# Patient Record
Sex: MALE | Race: BLACK OR AFRICAN AMERICAN | Employment: OTHER | URBAN - METROPOLITAN AREA
[De-identification: names, ages, dates, MRNs, and addresses within clinical notes are randomized per-mention and may not be internally consistent; named-entity substitution may affect disease eponyms.]

---

## 2017-09-14 ENCOUNTER — HOSPITAL ENCOUNTER (OUTPATIENT)
Age: 77
Setting detail: SPECIMEN
Discharge: HOME | End: 2017-09-14
Payer: MEDICARE

## 2017-09-14 LAB
ABSOLUTE EOS #: 0.2 K/UL (ref 0–0.4)
ABSOLUTE LYMPH #: 2.9 K/UL (ref 1–4.8)
ABSOLUTE MONO #: 0.8 K/UL (ref 0.1–1.2)
BASOPHILS # BLD: 0 %
BASOPHILS ABSOLUTE: 0 K/UL (ref 0–0.2)
C-REACTIVE PROTEIN: 0.6 MG/L (ref 0–5)
DIFFERENTIAL TYPE: NORMAL
EOSINOPHILS RELATIVE PERCENT: 3 %
HCT VFR BLD CALC: 43.8 % (ref 41–53)
HEMOGLOBIN: 14.5 G/DL (ref 13.5–17.5)
LYMPHOCYTES # BLD: 38 %
MCH RBC QN AUTO: 31 PG (ref 26–34)
MCHC RBC AUTO-ENTMCNC: 33.2 G/DL (ref 31–37)
MCV RBC AUTO: 93.4 FL (ref 80–100)
MONOCYTES # BLD: 10 %
PDW BLD-RTO: 15.1 % (ref 12.5–15.4)
PLATELET # BLD: NORMAL K/UL (ref 140–450)
PLATELET ESTIMATE: NORMAL
PMV BLD AUTO: NORMAL FL (ref 6–12)
RBC # BLD: 4.7 M/UL (ref 4.5–5.9)
RBC # BLD: NORMAL 10*6/UL
SEDIMENTATION RATE, ERYTHROCYTE: 1 MM (ref 0–10)
SEG NEUTROPHILS: 49 %
SEGMENTED NEUTROPHILS ABSOLUTE COUNT: 3.8 K/UL (ref 1.8–7.7)
WBC # BLD: 7.8 K/UL (ref 3.5–11)
WBC # BLD: NORMAL 10*3/UL

## 2017-09-16 LAB
CULTURE: ABNORMAL
CULTURE: NO GROWTH
DIRECT EXAM: ABNORMAL
Lab: ABNORMAL
SPECIMEN DESCRIPTION: ABNORMAL
STATUS: ABNORMAL

## 2017-11-14 ENCOUNTER — HOSPITAL ENCOUNTER (OUTPATIENT)
Age: 77
Setting detail: SPECIMEN
Discharge: HOME | End: 2017-11-14
Payer: MEDICARE

## 2017-11-15 LAB
APPEARANCE FLUID: NORMAL
BASO FLUID: NORMAL %
COLOR FLUID: NORMAL
CRYSTALS, FLUID: NEGATIVE
EOSINOPHIL FLUID: NORMAL %
FLUID DIFF COMMENT: NORMAL
LYMPHOCYTES, BODY FLUID: 1 %
MONOCYTE, FLUID: NORMAL %
NEUTROPHIL, FLUID: 4 %
OTHER CELLS FLUID: NORMAL %
RBC FLUID: NORMAL /MM3
SPECIMEN TYPE: NORMAL
SPECIMEN TYPE: NORMAL
WBC FLUID: 755 /MM3

## 2018-08-10 ENCOUNTER — ANESTHESIA EVENT (OUTPATIENT)
Dept: OPERATING ROOM | Age: 78
End: 2018-08-10
Payer: MEDICARE

## 2018-08-13 ENCOUNTER — HOSPITAL ENCOUNTER (OUTPATIENT)
Age: 78
Setting detail: OUTPATIENT SURGERY
Discharge: HOME | End: 2018-08-13
Attending: SURGERY | Admitting: SURGERY
Payer: MEDICARE

## 2018-08-13 ENCOUNTER — ANESTHESIA (OUTPATIENT)
Dept: OPERATING ROOM | Age: 78
End: 2018-08-13
Payer: MEDICARE

## 2018-08-13 VITALS — DIASTOLIC BLOOD PRESSURE: 97 MMHG | SYSTOLIC BLOOD PRESSURE: 173 MMHG | OXYGEN SATURATION: 100 %

## 2018-08-13 VITALS
WEIGHT: 150 LBS | BODY MASS INDEX: 23.54 KG/M2 | OXYGEN SATURATION: 98 % | RESPIRATION RATE: 18 BRPM | TEMPERATURE: 96.8 F | HEART RATE: 90 BPM | SYSTOLIC BLOOD PRESSURE: 157 MMHG | HEIGHT: 67 IN | DIASTOLIC BLOOD PRESSURE: 73 MMHG

## 2018-08-13 LAB
GLUCOSE BLD-MCNC: 128 MG/DL (ref 75–110)
GLUCOSE BLD-MCNC: 128 MG/DL (ref 75–110)

## 2018-08-13 PROCEDURE — 82947 ASSAY GLUCOSE BLOOD QUANT: CPT

## 2018-08-13 PROCEDURE — 3609010600 HC COLONOSCOPY POLYPECTOMY SNARE/COLD BIOPSY: Performed by: SURGERY

## 2018-08-13 PROCEDURE — 3609012400 HC EGD TRANSORAL BIOPSY SINGLE/MULTIPLE: Performed by: SURGERY

## 2018-08-13 PROCEDURE — 2709999900 HC NON-CHARGEABLE SUPPLY: Performed by: SURGERY

## 2018-08-13 PROCEDURE — 7100000011 HC PHASE II RECOVERY - ADDTL 15 MIN: Performed by: SURGERY

## 2018-08-13 PROCEDURE — 7100000010 HC PHASE II RECOVERY - FIRST 15 MIN: Performed by: SURGERY

## 2018-08-13 PROCEDURE — 6370000000 HC RX 637 (ALT 250 FOR IP): Performed by: SURGERY

## 2018-08-13 PROCEDURE — 3700000001 HC ADD 15 MINUTES (ANESTHESIA): Performed by: SURGERY

## 2018-08-13 PROCEDURE — 2500000003 HC RX 250 WO HCPCS: Performed by: ANESTHESIOLOGY

## 2018-08-13 PROCEDURE — 6360000002 HC RX W HCPCS: Performed by: ANESTHESIOLOGY

## 2018-08-13 PROCEDURE — 88305 TISSUE EXAM BY PATHOLOGIST: CPT

## 2018-08-13 PROCEDURE — 2500000003 HC RX 250 WO HCPCS: Performed by: NURSE ANESTHETIST, CERTIFIED REGISTERED

## 2018-08-13 PROCEDURE — 6360000002 HC RX W HCPCS: Performed by: NURSE ANESTHETIST, CERTIFIED REGISTERED

## 2018-08-13 PROCEDURE — 2580000003 HC RX 258: Performed by: NURSE ANESTHETIST, CERTIFIED REGISTERED

## 2018-08-13 PROCEDURE — 3700000000 HC ANESTHESIA ATTENDED CARE: Performed by: SURGERY

## 2018-08-13 RX ORDER — PROPOFOL 10 MG/ML
INJECTION, EMULSION INTRAVENOUS PRN
Status: DISCONTINUED | OUTPATIENT
Start: 2018-08-13 | End: 2018-08-13 | Stop reason: SDUPTHER

## 2018-08-13 RX ORDER — SODIUM CHLORIDE, SODIUM LACTATE, POTASSIUM CHLORIDE, CALCIUM CHLORIDE 600; 310; 30; 20 MG/100ML; MG/100ML; MG/100ML; MG/100ML
INJECTION, SOLUTION INTRAVENOUS CONTINUOUS PRN
Status: DISCONTINUED | OUTPATIENT
Start: 2018-08-13 | End: 2018-08-13 | Stop reason: SDUPTHER

## 2018-08-13 RX ORDER — ESMOLOL HYDROCHLORIDE 10 MG/ML
INJECTION INTRAVENOUS PRN
Status: DISCONTINUED | OUTPATIENT
Start: 2018-08-13 | End: 2018-08-13 | Stop reason: SDUPTHER

## 2018-08-13 RX ORDER — LIDOCAINE HYDROCHLORIDE 10 MG/ML
1 INJECTION, SOLUTION EPIDURAL; INFILTRATION; INTRACAUDAL; PERINEURAL
Status: DISCONTINUED | OUTPATIENT
Start: 2018-08-14 | End: 2018-08-13 | Stop reason: HOSPADM

## 2018-08-13 RX ORDER — FENTANYL CITRATE 50 UG/ML
50 INJECTION, SOLUTION INTRAMUSCULAR; INTRAVENOUS EVERY 5 MIN PRN
Status: DISCONTINUED | OUTPATIENT
Start: 2018-08-13 | End: 2018-08-13 | Stop reason: HOSPADM

## 2018-08-13 RX ORDER — ONDANSETRON 2 MG/ML
4 INJECTION INTRAMUSCULAR; INTRAVENOUS
Status: DISCONTINUED | OUTPATIENT
Start: 2018-08-13 | End: 2018-08-13 | Stop reason: HOSPADM

## 2018-08-13 RX ORDER — SODIUM CHLORIDE 0.9 % (FLUSH) 0.9 %
10 SYRINGE (ML) INJECTION PRN
Status: DISCONTINUED | OUTPATIENT
Start: 2018-08-13 | End: 2018-08-13 | Stop reason: HOSPADM

## 2018-08-13 RX ORDER — HYDRALAZINE HYDROCHLORIDE 20 MG/ML
5 INJECTION INTRAMUSCULAR; INTRAVENOUS ONCE
Status: COMPLETED | OUTPATIENT
Start: 2018-08-13 | End: 2018-08-13

## 2018-08-13 RX ORDER — LIDOCAINE HYDROCHLORIDE 10 MG/ML
INJECTION, SOLUTION EPIDURAL; INFILTRATION; INTRACAUDAL; PERINEURAL PRN
Status: DISCONTINUED | OUTPATIENT
Start: 2018-08-13 | End: 2018-08-13 | Stop reason: SDUPTHER

## 2018-08-13 RX ORDER — SODIUM CHLORIDE 0.9 % (FLUSH) 0.9 %
10 SYRINGE (ML) INJECTION EVERY 12 HOURS SCHEDULED
Status: DISCONTINUED | OUTPATIENT
Start: 2018-08-13 | End: 2018-08-13 | Stop reason: HOSPADM

## 2018-08-13 RX ORDER — LABETALOL HYDROCHLORIDE 5 MG/ML
5 INJECTION, SOLUTION INTRAVENOUS ONCE
Status: COMPLETED | OUTPATIENT
Start: 2018-08-13 | End: 2018-08-13

## 2018-08-13 RX ORDER — SODIUM CHLORIDE, SODIUM LACTATE, POTASSIUM CHLORIDE, CALCIUM CHLORIDE 600; 310; 30; 20 MG/100ML; MG/100ML; MG/100ML; MG/100ML
INJECTION, SOLUTION INTRAVENOUS CONTINUOUS
Status: DISCONTINUED | OUTPATIENT
Start: 2018-08-14 | End: 2018-08-13 | Stop reason: HOSPADM

## 2018-08-13 RX ORDER — FENTANYL CITRATE 50 UG/ML
25 INJECTION, SOLUTION INTRAMUSCULAR; INTRAVENOUS EVERY 5 MIN PRN
Status: DISCONTINUED | OUTPATIENT
Start: 2018-08-13 | End: 2018-08-13 | Stop reason: HOSPADM

## 2018-08-13 RX ORDER — SODIUM CHLORIDE 9 MG/ML
INJECTION, SOLUTION INTRAVENOUS CONTINUOUS
Status: DISCONTINUED | OUTPATIENT
Start: 2018-08-14 | End: 2018-08-13 | Stop reason: HOSPADM

## 2018-08-13 RX ADMIN — PROPOFOL 20 MG: 10 INJECTION, EMULSION INTRAVENOUS at 09:57

## 2018-08-13 RX ADMIN — PROPOFOL 20 MG: 10 INJECTION, EMULSION INTRAVENOUS at 10:25

## 2018-08-13 RX ADMIN — PROPOFOL 20 MG: 10 INJECTION, EMULSION INTRAVENOUS at 10:42

## 2018-08-13 RX ADMIN — PROPOFOL 20 MG: 10 INJECTION, EMULSION INTRAVENOUS at 10:36

## 2018-08-13 RX ADMIN — LABETALOL HYDROCHLORIDE 5 MG: 5 INJECTION, SOLUTION INTRAVENOUS at 12:50

## 2018-08-13 RX ADMIN — HYDRALAZINE HYDROCHLORIDE 5 MG: 20 INJECTION INTRAMUSCULAR; INTRAVENOUS at 13:13

## 2018-08-13 RX ADMIN — PROPOFOL 20 MG: 10 INJECTION, EMULSION INTRAVENOUS at 10:01

## 2018-08-13 RX ADMIN — PROPOFOL 20 MG: 10 INJECTION, EMULSION INTRAVENOUS at 10:17

## 2018-08-13 RX ADMIN — PROPOFOL 20 MG: 10 INJECTION, EMULSION INTRAVENOUS at 09:53

## 2018-08-13 RX ADMIN — PROPOFOL 20 MG: 10 INJECTION, EMULSION INTRAVENOUS at 10:32

## 2018-08-13 RX ADMIN — PROPOFOL 20 MG: 10 INJECTION, EMULSION INTRAVENOUS at 10:40

## 2018-08-13 RX ADMIN — ESMOLOL HYDROCHLORIDE 5 MG: 10 INJECTION, SOLUTION INTRAVENOUS at 09:56

## 2018-08-13 RX ADMIN — PROPOFOL 20 MG: 10 INJECTION, EMULSION INTRAVENOUS at 10:13

## 2018-08-13 RX ADMIN — PROPOFOL 20 MG: 10 INJECTION, EMULSION INTRAVENOUS at 10:29

## 2018-08-13 RX ADMIN — LABETALOL HYDROCHLORIDE 5 MG: 5 INJECTION, SOLUTION INTRAVENOUS at 12:11

## 2018-08-13 RX ADMIN — PROPOFOL 20 MG: 10 INJECTION, EMULSION INTRAVENOUS at 10:07

## 2018-08-13 RX ADMIN — SODIUM CHLORIDE, POTASSIUM CHLORIDE, SODIUM LACTATE AND CALCIUM CHLORIDE: 600; 310; 30; 20 INJECTION, SOLUTION INTRAVENOUS at 09:43

## 2018-08-13 RX ADMIN — PROPOFOL 20 MG: 10 INJECTION, EMULSION INTRAVENOUS at 10:04

## 2018-08-13 RX ADMIN — PROPOFOL 20 MG: 10 INJECTION, EMULSION INTRAVENOUS at 10:27

## 2018-08-13 RX ADMIN — PROPOFOL 20 MG: 10 INJECTION, EMULSION INTRAVENOUS at 10:34

## 2018-08-13 RX ADMIN — PROPOFOL 20 MG: 10 INJECTION, EMULSION INTRAVENOUS at 10:19

## 2018-08-13 RX ADMIN — PROPOFOL 20 MG: 10 INJECTION, EMULSION INTRAVENOUS at 10:11

## 2018-08-13 RX ADMIN — PROPOFOL 50 MG: 10 INJECTION, EMULSION INTRAVENOUS at 09:51

## 2018-08-13 RX ADMIN — PROPOFOL 20 MG: 10 INJECTION, EMULSION INTRAVENOUS at 10:15

## 2018-08-13 RX ADMIN — PROPOFOL 100 MG: 10 INJECTION, EMULSION INTRAVENOUS at 09:49

## 2018-08-13 RX ADMIN — PROPOFOL 20 MG: 10 INJECTION, EMULSION INTRAVENOUS at 10:23

## 2018-08-13 RX ADMIN — PROPOFOL 20 MG: 10 INJECTION, EMULSION INTRAVENOUS at 09:59

## 2018-08-13 RX ADMIN — PROPOFOL 20 MG: 10 INJECTION, EMULSION INTRAVENOUS at 10:44

## 2018-08-13 RX ADMIN — PROPOFOL 20 MG: 10 INJECTION, EMULSION INTRAVENOUS at 10:09

## 2018-08-13 RX ADMIN — PROPOFOL 20 MG: 10 INJECTION, EMULSION INTRAVENOUS at 09:55

## 2018-08-13 RX ADMIN — LIDOCAINE HYDROCHLORIDE 50 MG: 10 INJECTION, SOLUTION EPIDURAL; INFILTRATION; INTRACAUDAL; PERINEURAL at 09:49

## 2018-08-13 RX ADMIN — PROPOFOL 20 MG: 10 INJECTION, EMULSION INTRAVENOUS at 10:38

## 2018-08-13 RX ADMIN — PROPOFOL 20 MG: 10 INJECTION, EMULSION INTRAVENOUS at 10:21

## 2018-08-13 RX ADMIN — ESMOLOL HYDROCHLORIDE 10 MG: 10 INJECTION, SOLUTION INTRAVENOUS at 10:05

## 2018-08-13 RX ADMIN — PROPOFOL 20 MG: 10 INJECTION, EMULSION INTRAVENOUS at 10:30

## 2018-08-13 ASSESSMENT — PULMONARY FUNCTION TESTS
PIF_VALUE: 1
PIF_VALUE: 0
PIF_VALUE: 1
PIF_VALUE: 2
PIF_VALUE: 1
PIF_VALUE: 0
PIF_VALUE: 1

## 2018-08-13 ASSESSMENT — PAIN SCALES - GENERAL
PAINLEVEL_OUTOF10: 0

## 2018-08-13 NOTE — OP NOTE
[address] [facility] [address]                 [address] [place] [address]                                 OPERATIVE REPORT    PATIENT NAME: Blaire Basilio                      :        1940  MED REC NO:   [id number]                             ROOM:  ACCOUNT NO:   [de-identified]                           ADMIT DATE: 2018  PROVIDER:     Stan Jimenes    DATE OF PROCEDURE:  2018    PREPROCEDURE DIAGNOSES:  History of gastritis and colon polyps. POSTPROCEDURE DIAGNOSES:  History of gastritis and colon polyps. PROCEDURE PERFORMED:  Esophagogastroduodenoscopy with gastric biopsies  followed by colonoscopy to the cecum where random cecal biopsies and  polypectomies at 1 m, 70 cm, 60 cm in the rectal area. ENDOSCOPIST:  Dr. Alba Johnson:  JEMIMA.    INDICATIONS:  This is an 69-year-old -American male has a history of  pancreatic cancer and previously has had two colon polyps removed and he  has had an abnormal gastric biopsy. He is brought in now for upper and  lower GI endoscopies. FINDINGS:  The esophagus was normal.  The Z-line was normal.  The stomach  has some chronic inflammation in the distal antral area. The pylorus was  normal as was the duodenum. After random biopsies were taken from the  antral area of the stomach, then flexible colonoscopy was carried out to  the cecum using a variable stiffness scope. Random cecal biopsies were  taken and careful withdrawal showed that he had some liquid stools  throughout his bowel, which _____ meticulous evaluation of the mucosa. However, there were polyps seen at 1 m, 60 cm, 70 cm in the rectal area. All the polyps seen were removed using a cold biopsy snare technique. At  the end of the procedure, the patient was taken to recovery in satisfactory  condition.         Ken Still    D: 2018 11:10:43       T: 2018 15:32:58     [email or web address]  Job#: [id number]

## 2018-08-13 NOTE — BRIEF OP NOTE
Brief Postoperative Note    Arnold Short  YOB: 1940  [id number]    Pre-operative Diagnosis: hx colon polyps and gastritis    Post-operative Diagnosis: Same    Procedure: EGD and Bx and colonoscopic polypectomies    Anesthesia: MAC    Surgeons/Assistants: none    Estimated Blood Loss: less than 50     Complications: None    Specimens: Was Obtained: from 100 ms, 70 cms and 60cms    Findings:  Chronic gastritis and colon polyps    Electronically signed by Javier Dutta MD on 8/13/2018 at 10:54 AM

## 2018-08-13 NOTE — ANESTHESIA PRE PROCEDURE
Alcohol use No                                Counseling given: Not Answered      Vital Signs (Current):   Vitals:    08/13/18 0848   BP: (!) 173/93   Pulse: 70   Resp: 16   Temp: 98.4 °F (36.9 °C)   TempSrc: Oral   SpO2: 99%   Weight: 150 lb (68 kg)   Height: 5' 7\" (1.702 m)                                              BP Readings from Last 3 Encounters:   08/13/18 (!) 173/93   10/30/17 124/80   10/17/16 198/89       NPO Status: Time of last liquid consumption: 1900                        Time of last solid consumption: 0800                        Date of last liquid consumption: 08/12/18                        Date of last solid food consumption: 08/12/18    BMI:   Wt Readings from Last 3 Encounters:   08/13/18 150 lb (68 kg)   10/30/17 150 lb (68 kg)   10/17/16 150 lb (68 kg)     Body mass index is 23.49 kg/m². CBC:   Lab Results   Component Value Date    WBC 7.8 09/14/2017    RBC 4.70 09/14/2017    RBC 4.19 01/04/2012    HGB 14.5 09/14/2017    HCT 43.8 09/14/2017    MCV 93.4 09/14/2017    RDW 15.1 09/14/2017    PLT Platelet clumps present, count appears adequate. 09/14/2017     01/04/2012       CMP:   Lab Results   Component Value Date     12/08/2015    K 4.1 12/08/2015     12/08/2015    CO2 26 12/08/2015    BUN 9 12/08/2015    CREATININE 0.92 12/08/2015    GFRAA >60 03/20/2015    LABGLOM >60 03/20/2015    GLUCOSE 144 12/08/2015    GLUCOSE 135 03/07/2012    PROT 7.2 10/02/2014    CALCIUM 9.7 12/08/2015    BILITOT 0.7 12/08/2015    ALKPHOS 94 12/08/2015    AST 24 12/08/2015    ALT 19 12/08/2015       POC Tests: No results for input(s): POCGLU, POCNA, POCK, POCCL, POCBUN, POCHEMO, POCHCT in the last 72 hours.     Coags:   Lab Results   Component Value Date    PROTIME 10.7 09/05/2013    INR 1.0 09/05/2013       HCG (If Applicable): No results found for: PREGTESTUR, PREGSERUM, HCG, HCGQUANT     ABGs: No results found for: PHART, PO2ART, BWK5WGO, KNR0YCR, BEART, W3WLBOGX     Type & Screen (If Applicable):  No results found for: LABABO, LABRH    Anesthesia Evaluation   no history of anesthetic complications:   Airway: Mallampati: I  TM distance: >3 FB   Neck ROM: full  Mouth opening: > = 3 FB Dental:    (+) edentulous      Pulmonary: breath sounds clear to auscultation      (-) COPD, asthma, recent URI and sleep apnea                           Cardiovascular:    (+) hypertension:, past MI: > 6 months, CABG/stent:, hyperlipidemia    (-) dysrhythmias,  angina and  DURAND      Rhythm: regular  Rate: normal                    Neuro/Psych:   Negative Neuro/Psych ROS              GI/Hepatic/Renal:   (+) hiatal hernia, PUD, bowel prep,           Endo/Other:    (+) DiabetesType II DM, , : arthritis: OA., .                 Abdominal:           Vascular:                                        Anesthesia Plan      MAC     ASA 3       Induction: intravenous. Anesthetic plan and risks discussed with patient.                       Derik Rodriguez MD   8/13/2018

## 2018-08-13 NOTE — ANESTHESIA POSTPROCEDURE EVALUATION
Department of Anesthesiology  Postprocedure Note    Patient: Belen Ku  MRN: [id number]  YOB: 1940  Date of evaluation: 8/13/2018  Time:  11:55 AM     Procedure Summary     Date:  08/13/18 Room / Location:  [place] [address] / [facility] OR    Anesthesia Start:  1059 Anesthesia Stop:  0580    Procedures:       EGD BIOPSY      COLONOSCOPY POLYPECTOMY COLD BIOPSY Diagnosis:  (DX HX GASTRIC ULCER, HX ADENOMATOUS POLYPS)    Surgeon:  Ernesto Arevalo MD Responsible Provider:  Lizbet Hickman MD    Anesthesia Type:  MAC ASA Status:  3          Anesthesia Type: MAC    Iván Phase I: Iván Score: 10    Iván Phase II: Iván Score: 6    Last vitals: Reviewed and per EMR flowsheets.        Anesthesia Post Evaluation    Patient location during evaluation: PACU  Level of consciousness: awake and alert  Airway patency: patent  Nausea & Vomiting: no nausea and no vomiting  Complications: no  Cardiovascular status: blood pressure returned to baseline  Respiratory status: acceptable  Hydration status: euvolemic

## 2018-08-14 LAB — SURGICAL PATHOLOGY REPORT: NORMAL

## 2019-01-31 ENCOUNTER — HOSPITAL ENCOUNTER (OUTPATIENT)
Age: 79
Setting detail: SPECIMEN
Discharge: HOME | End: 2019-01-31
Payer: MEDICARE

## 2019-01-31 LAB — MAGNESIUM: 1.3 MG/DL (ref 1.6–2.6)

## 2019-01-31 PROCEDURE — 36415 COLL VENOUS BLD VENIPUNCTURE: CPT

## 2019-01-31 PROCEDURE — P9603 ONE-WAY ALLOW PRORATED MILES: HCPCS

## 2019-01-31 PROCEDURE — 83735 ASSAY OF MAGNESIUM: CPT

## 2022-07-21 ENCOUNTER — OFFICE VISIT (OUTPATIENT)
Dept: FAMILY MEDICINE CLINIC | Age: 82
End: 2022-07-21
Payer: MEDICARE

## 2022-07-21 VITALS
WEIGHT: 150 LBS | DIASTOLIC BLOOD PRESSURE: 74 MMHG | HEART RATE: 88 BPM | BODY MASS INDEX: 23.49 KG/M2 | SYSTOLIC BLOOD PRESSURE: 136 MMHG

## 2022-07-21 DIAGNOSIS — I25.83 CORONARY ARTERY DISEASE DUE TO LIPID RICH PLAQUE: ICD-10-CM

## 2022-07-21 DIAGNOSIS — I25.10 CORONARY ARTERY DISEASE DUE TO LIPID RICH PLAQUE: ICD-10-CM

## 2022-07-21 DIAGNOSIS — E11.69 TYPE 2 DIABETES MELLITUS WITH OTHER SPECIFIED COMPLICATION, WITHOUT LONG-TERM CURRENT USE OF INSULIN (HCC): Primary | ICD-10-CM

## 2022-07-21 DIAGNOSIS — R82.90 URINE ABNORMALITY: ICD-10-CM

## 2022-07-21 DIAGNOSIS — N50.811 PAIN IN RIGHT TESTICLE: ICD-10-CM

## 2022-07-21 DIAGNOSIS — Z00.00 HEALTHCARE MAINTENANCE: ICD-10-CM

## 2022-07-21 LAB — HBA1C MFR BLD: 10.1 %

## 2022-07-21 PROCEDURE — 3046F HEMOGLOBIN A1C LEVEL >9.0%: CPT | Performed by: STUDENT IN AN ORGANIZED HEALTH CARE EDUCATION/TRAINING PROGRAM

## 2022-07-21 PROCEDURE — 83036 HEMOGLOBIN GLYCOSYLATED A1C: CPT | Performed by: STUDENT IN AN ORGANIZED HEALTH CARE EDUCATION/TRAINING PROGRAM

## 2022-07-21 PROCEDURE — G8427 DOCREV CUR MEDS BY ELIG CLIN: HCPCS | Performed by: STUDENT IN AN ORGANIZED HEALTH CARE EDUCATION/TRAINING PROGRAM

## 2022-07-21 PROCEDURE — 99203 OFFICE O/P NEW LOW 30 MIN: CPT | Performed by: STUDENT IN AN ORGANIZED HEALTH CARE EDUCATION/TRAINING PROGRAM

## 2022-07-21 PROCEDURE — G8420 CALC BMI NORM PARAMETERS: HCPCS | Performed by: STUDENT IN AN ORGANIZED HEALTH CARE EDUCATION/TRAINING PROGRAM

## 2022-07-21 PROCEDURE — 1123F ACP DISCUSS/DSCN MKR DOCD: CPT | Performed by: STUDENT IN AN ORGANIZED HEALTH CARE EDUCATION/TRAINING PROGRAM

## 2022-07-21 PROCEDURE — 1036F TOBACCO NON-USER: CPT | Performed by: STUDENT IN AN ORGANIZED HEALTH CARE EDUCATION/TRAINING PROGRAM

## 2022-07-21 RX ORDER — TAMSULOSIN HYDROCHLORIDE 0.4 MG/1
0.4 CAPSULE ORAL DAILY
Qty: 30 CAPSULE | Refills: 0 | Status: SHIPPED | OUTPATIENT
Start: 2022-07-21

## 2022-07-21 RX ORDER — LISINOPRIL 2.5 MG/1
TABLET ORAL
Qty: 30 TABLET | Refills: 2 | Status: SHIPPED | OUTPATIENT
Start: 2022-07-21

## 2022-07-21 RX ORDER — ATORVASTATIN CALCIUM 40 MG/1
40 TABLET, FILM COATED ORAL DAILY
Qty: 90 TABLET | Refills: 1 | Status: SHIPPED | OUTPATIENT
Start: 2022-07-21

## 2022-07-21 RX ORDER — ASPIRIN 81 MG/1
81 TABLET ORAL DAILY
Qty: 90 TABLET | Refills: 1 | Status: SHIPPED | OUTPATIENT
Start: 2022-07-21

## 2022-07-21 RX ORDER — GABAPENTIN 300 MG/1
CAPSULE ORAL
Qty: 180 CAPSULE | Refills: 1 | Status: SHIPPED | OUTPATIENT
Start: 2022-07-21 | End: 2022-09-21

## 2022-07-21 RX ORDER — CARVEDILOL 6.25 MG/1
TABLET ORAL
Qty: 60 TABLET | Refills: 2 | Status: SHIPPED | OUTPATIENT
Start: 2022-07-21

## 2022-07-21 SDOH — ECONOMIC STABILITY: FOOD INSECURITY: WITHIN THE PAST 12 MONTHS, YOU WORRIED THAT YOUR FOOD WOULD RUN OUT BEFORE YOU GOT MONEY TO BUY MORE.: NEVER TRUE

## 2022-07-21 SDOH — ECONOMIC STABILITY: FOOD INSECURITY: WITHIN THE PAST 12 MONTHS, THE FOOD YOU BOUGHT JUST DIDN'T LAST AND YOU DIDN'T HAVE MONEY TO GET MORE.: NEVER TRUE

## 2022-07-21 ASSESSMENT — ENCOUNTER SYMPTOMS
SINUS PAIN: 0
WHEEZING: 0
DIARRHEA: 0
NAUSEA: 0
CONSTIPATION: 0
COUGH: 0
VOMITING: 0
BACK PAIN: 0
COLOR CHANGE: 0
ABDOMINAL PAIN: 0
SHORTNESS OF BREATH: 0
SINUS PRESSURE: 0
BLOOD IN STOOL: 0

## 2022-07-21 ASSESSMENT — PATIENT HEALTH QUESTIONNAIRE - PHQ9
2. FEELING DOWN, DEPRESSED OR HOPELESS: 0
1. LITTLE INTEREST OR PLEASURE IN DOING THINGS: 0
SUM OF ALL RESPONSES TO PHQ9 QUESTIONS 1 & 2: 0
SUM OF ALL RESPONSES TO PHQ QUESTIONS 1-9: 0

## 2022-07-21 ASSESSMENT — SOCIAL DETERMINANTS OF HEALTH (SDOH): HOW HARD IS IT FOR YOU TO PAY FOR THE VERY BASICS LIKE FOOD, HOUSING, MEDICAL CARE, AND HEATING?: NOT HARD AT ALL

## 2022-07-21 NOTE — PROGRESS NOTES
Visit Information    Have you changed or started any medications since your last visit including any over-the-counter medicines, vitamins, or herbal medicines? no   Are you having any side effects from any of your medications? -  no  Have you stopped taking any of your medications? Is so, why? -  no    Have you seen any other physician or provider since your last visit? Yes - Records Requested  Have you had any other diagnostic tests since your last visit? No  Have you been seen in the emergency room and/or had an admission to a hospital since we last saw you? No  Have you had your routine dental cleaning in the past 6 months? no    Have you activated your [employer] account? If not, what are your barriers?  No:      Patient Care Team:  Manan Vásquez MD as PCP - General (Internal Medicine)    Medical History Review  Past Medical, Family, and Social History reviewed and does not contribute to the patient presenting condition    Health Maintenance   Topic Date Due    Depression Screen  Never done    Shingles vaccine (2 of 3) 07/24/2008    A1C test (Diabetic or Prediabetic)  06/08/2016    Lipids  12/08/2016    Flu vaccine (1) 09/01/2022    DTaP/Tdap/Td vaccine (2 - Td or Tdap) 07/24/2030    Pneumococcal 65+ years Vaccine  Completed    COVID-19 Vaccine  Completed    Hepatitis A vaccine  Aged Out    Hepatitis B vaccine  Aged Out    Hib vaccine  Aged Out    Meningococcal (ACWY) vaccine  Aged Out

## 2022-07-21 NOTE — PROGRESS NOTES
Subjective: Bill Antoine is a 78 y.o. male with  has a past medical history of Arthritis, Balance problem, Cancer ([address] Diabetes mellitus ([address] Glaucoma, Heart attack ([address] Hyperlipidemia, Hypertension, and Urinary incontinence. Presented to the office today for:  Chief Complaint   Patient presents with    New Patient     Establish care       HPI  This is an 49-year-old gentleman with a history of CAD s/p 2 times back in 2019?,  Essential hypertension and type 2 diabetes mellitus came in today to establish care. Patient only concern during this time is chronic testicular pain which is going on for years, patient states that he does not have any abdominal pain, burning urination, fever or chills or any lumps or bumps in the area. Patient also mentioned that he has difficulty urination and sometimes he has to strain. Patient mentioned that he has some prostate disease? Is not sure if he was in treatment. Review of Systems   Constitutional:  Negative for chills and fever. HENT:  Negative for congestion, sinus pressure and sinus pain. Respiratory:  Negative for cough, shortness of breath and wheezing. Cardiovascular:  Negative for chest pain, palpitations and leg swelling. Gastrointestinal:  Negative for abdominal pain, blood in stool, constipation, diarrhea, nausea and vomiting. Genitourinary:  Positive for difficulty urinating, frequency and testicular pain. Negative for flank pain, genital sores, hematuria, penile pain, penile swelling and scrotal swelling. Musculoskeletal:  Negative for arthralgias, back pain and myalgias. Skin:  Negative for color change and wound. Neurological:  Negative for dizziness, light-headedness and headaches. Psychiatric/Behavioral:  Negative for agitation and confusion. ROS negative except what mentioned in HPI. The patient has a No family history on file.     Objective:    /74 (Site: Right Upper Arm, Position: Sitting, Cuff Size: Medium Adult)   Pulse 88   Wt 150 lb (68 kg)   BMI 23.49 kg/m²    BP Readings from Last 3 Encounters:   07/21/22 136/74   08/13/18 (!) 173/97   08/13/18 (!) 157/73       Physical Exam  Vitals and nursing note reviewed. Constitutional:       Appearance: Normal appearance. HENT:      Mouth/Throat:      Mouth: Mucous membranes are moist.   Eyes:      Conjunctiva/sclera: Conjunctivae normal.   Cardiovascular:      Rate and Rhythm: Normal rate and regular rhythm. Pulmonary:      Effort: Pulmonary effort is normal.      Breath sounds: Normal breath sounds. Abdominal:      General: Bowel sounds are normal. There is no distension. Palpations: Abdomen is soft. There is no mass. Tenderness: There is no abdominal tenderness. There is no guarding. Musculoskeletal:      Right lower leg: No edema. Left lower leg: No edema. Neurological:      General: No focal deficit present. Mental Status: He is alert and oriented to person, place, and time. Psychiatric:         Mood and Affect: Mood normal.         Behavior: Behavior normal.       Lab Results   Component Value Date    WBC 7.8 09/14/2017    HGB 14.5 09/14/2017    HCT 43.8 09/14/2017    PLT Platelet clumps present, count appears adequate. 09/14/2017    CHOL 80 12/08/2015    TRIG 90 12/08/2015    HDL 32 (A) 12/08/2015    ALT 19 12/08/2015    AST 24 12/08/2015     12/08/2015    K 4.1 12/08/2015     12/08/2015    CREATININE 0.92 12/08/2015    BUN 9 12/08/2015    CO2 26 12/08/2015    PSA 2.41 03/20/2015    INR 1.0 09/05/2013    LABA1C 10.1 07/21/2022     Lab Results   Component Value Date    CALCIUM 9.7 12/08/2015    PHOS 3.7 08/02/2012     Lab Results   Component Value Date    LDLCALC 30 12/08/2015       Examination including this and mentioned above in HPI. Assessment and Plan:    1.  Type 2 diabetes mellitus with other specified complication, without long-term current use of insulin (HCC)  - HbA1c: 10.1 (T)  - Increased metformin and start patient on Januvia. We will reassess patient in 6 weeks, counseled the patient that if he has brain fogginess, nausea, unable to eat anything or have any diffuse abdominal pain should go to the ED for further assessment. - metFORMIN (GLUCOPHAGE) 1000 MG tablet; Take 1 tablet by mouth in the morning and 1 tablet in the evening. Take with meals. Dispense: 180 tablet; Refill: 1  - SITagliptin (JANUVIA) 100 MG tablet; Take 1 tablet by mouth in the morning. Dispense: 30 tablet; Refill: 2  - gabapentin (NEURONTIN) 300 MG capsule; TAKE 2 CAPSULES THREE TIMES A DAY  Dispense: 180 capsule; Refill: 1      2. Coronary artery disease due to lipid rich plaque  - BP: 136/74  - carvedilol (COREG) 6.25 MG tablet; TAKE 1 TABLET TWICE A DAY  Dispense: 60 tablet; Refill: 2  - lisinopril (PRINIVIL;ZESTRIL) 2.5 MG tablet; TAKE 1 TABLET DAILY  Dispense: 30 tablet; Refill: 2  - atorvastatin (LIPITOR) 40 MG tablet; Take 1 tablet by mouth in the morning. Dispense: 90 tablet; Refill: 1  - aspirin EC 81 MG EC tablet; Take 1 tablet by mouth in the morning. Dispense: 90 tablet; Refill: 1    3. Difficulty in urination and testicular pain:  -The pain in the testicle is chronic low suspicion of torsion or orchitis. - We will try Flomax, counseled patient that he should establish care with a urologist and will get an ultrasound of scrotum to rule out any abnormalities. - Sade Crespo MD, Urology, [place]  - [facility] 0.4 MG capsule; Take 1 capsule by mouth in the morning. Dispense: 30 capsule;  Refill: 0            Requested Prescriptions     Signed Prescriptions Disp Refills    carvedilol (COREG) 6.25 MG tablet 60 tablet 2     Sig: TAKE 1 TABLET TWICE A DAY    gabapentin (NEURONTIN) 300 MG capsule 180 capsule 1     Sig: TAKE 2 CAPSULES THREE TIMES A DAY    lisinopril (PRINIVIL;ZESTRIL) 2.5 MG tablet 30 tablet 2     Sig: TAKE 1 TABLET DAILY    atorvastatin (LIPITOR) 40 MG tablet 90 tablet 1     Sig: Take 1 tablet by mouth in the morning. aspirin EC 81 MG EC tablet 90 tablet 1     Sig: Take 1 tablet by mouth in the morning. tamsulosin (FLOMAX) 0.4 MG capsule 30 capsule 0     Sig: Take 1 capsule by mouth in the morning. metFORMIN (GLUCOPHAGE) 1000 MG tablet 180 tablet 1     Sig: Take 1 tablet by mouth in the morning and 1 tablet in the evening. Take with meals. SITagliptin (JANUVIA) 100 MG tablet 30 tablet 2     Sig: Take 1 tablet by mouth in the morning. Medications Discontinued During This Encounter   Medication Reason    clopidogrel (PLAVIX) 75 MG tablet LIST CLEANUP    HYDROcodone-acetaminophen (NORCO) 5-325 MG per tablet LIST CLEANUP    Incontinence Supply Disposable (ATTENDS BRIEFS CLASSIC LARGE) MISC LIST CLEANUP    Glucose Blood (BLOOD GLUCOSE TEST STRIPS) STRP LIST CLEANUP    Incontinence Supply Disposable (PROCARE ADULT BRIEFS LARGE) MISC LIST CLEANUP    glucose monitoring kit (FREESTYLE) monitoring kit LIST CLEANUP    Lancets MISC LIST CLEANUP    aspirin 81 MG tablet DUPLICATE    atorvastatin (LIPITOR) 80 MG tablet LIST CLEANUP    travoprost, benzalkonium, (TRAVATAN) 0.004 % ophthalmic solution LIST CLEANUP    metFORMIN (GLUCOPHAGE) 500 MG tablet DOSE ADJUSTMENT    carvedilol (COREG) 6.25 MG tablet REORDER    gabapentin (NEURONTIN) 300 MG capsule REORDER    lisinopril (PRINIVIL;ZESTRIL) 2.5 MG tablet REORDER       Jenniferarabella Macet received counseling on the following healthy behaviors: nutrition, exercise and medication adherence      Patient was counseled about importance of taking medication which were prescribed today and also which he is already using during today conversation. Discussed use,benefit, and side effects of prescribed medications. Barriers to medication compliance addressed. Patient was also counseled that lifestyle changes including diet, smoking and use of less alcohol will benefit their health in long term.  All the question asked by the patient were answered in non-medical terms and to be best of writer knowledge. Patient understands and agree to the plan. Teach back method was used while having the conversation. All patient questions answered. Pt voiced understanding. Return in about 6 weeks (around 9/1/2022). Disclaimer: Some oral of this note was transcribed using voice-recognition software. This may cause typographical errors occasionally, please review it with the context of the note. Although all effort is made to fix these errors, please do not hesitate to contact our office if there Anson Flair concern with the understanding of this note.

## 2022-07-21 NOTE — PATIENT INSTRUCTIONS
Thank you for letting us take care of you today. We hope all your questions were addressed. If a question was overlooked or something else comes to mind after you return home, please contact a member of your Care Team listed below. Your Care Team at [place] [address] is Team #5  Tory Valdivia MD (Faculty)  Tivis Oppenheim, MD (Resident)  Xochitl Sorto MD (Resident)  Manny Edmondson MD (Resident)  Eshter Martines MD, (Resident)  Errol Murguia., CMA  Jose Sorenson., RMA  Taye Soares.,  LPN  Frankie Singleton., Katherine Dowd., [facility] office)  Rosa Sharp, [address] (Clinical Practice Manager)  Rockland Fleischer, [facility] (Clinical Pharmacist)       Office phone number: [phone]    If you need to get in right away due to illness, please be advised we have \"Same Day\" appointments available Monday-Friday. Please call us at [phone] option #3 to schedule your \"Same Day\" appointment.

## 2022-07-22 NOTE — PROGRESS NOTES
Attending Physician Statement    Wt Readings from Last 3 Encounters:   07/21/22 150 lb (68 kg)   08/13/18 150 lb (68 kg)   10/30/17 150 lb (68 kg)     Temp Readings from Last 3 Encounters:   08/13/18 96.8 °F (36 °C) (Temporal)   10/17/16 97 °F (36.1 °C) (Temporal)   09/26/16 97.9 °F (36.6 °C) (Temporal)     BP Readings from Last 3 Encounters:   07/21/22 136/74   08/13/18 (!) 173/97   08/13/18 (!) 157/73     Pulse Readings from Last 3 Encounters:   07/21/22 88   08/13/18 90   10/30/17 62         I have discussed the care of Louie Arana, including pertinent history and exam findings with the resident. I have reviewed the key elements of all parts of the encounter with the resident. I agree with the assessment, plan and orders as documented by the resident.   (GE Modifier)

## 2022-07-27 ENCOUNTER — TELEPHONE (OUTPATIENT)
Dept: FAMILY MEDICINE CLINIC | Age: 82
End: 2022-07-27

## 2022-07-27 NOTE — TELEPHONE ENCOUNTER
----- Message from Karoline Apgar sent at 2022  9:46 AM EDT -----  Subject: Referral Request    Reason for referral request? Urology pt states he received a referral for   urology but they cannot get him in until the end of Sept. requesting a new   referral so he can be seen sooner than that. please call pt to confirm. Provider patient wants to be referred to(if known):     Provider Phone Number(if known): Additional Information for Provider?   ---------------------------------------------------------------------------  --------------  [address] [facility]    [phone];  Do not leave any message, patient will call back for answer  ---------------------------------------------------------------------------  --------------

## 2022-08-04 ENCOUNTER — HOSPITAL ENCOUNTER (OUTPATIENT)
Dept: ULTRASOUND IMAGING | Facility: CLINIC | Age: 82
Discharge: HOME | End: 2022-08-06
Payer: MEDICARE

## 2022-08-04 DIAGNOSIS — N50.811 PAIN IN RIGHT TESTICLE: ICD-10-CM

## 2022-08-04 PROCEDURE — 76870 US EXAM SCROTUM: CPT

## 2022-10-24 ENCOUNTER — TELEPHONE (OUTPATIENT)
Dept: FAMILY MEDICINE CLINIC | Age: 82
End: 2022-10-24

## 2022-10-24 NOTE — TELEPHONE ENCOUNTER
Mikhail Layne,  from [facility] called to inform you that patient can't get out of bed and refused to call EMT for assistance,  can be reached at [phone].

## 2022-10-24 NOTE — TELEPHONE ENCOUNTER
Tomy Prater from [facility] went out to see him, and Cosmo Diego told her he eat a $17 piece a cheesecake. Tomy Prater said that he is fine now.

## 2022-10-25 ENCOUNTER — TELEPHONE (OUTPATIENT)
Dept: FAMILY MEDICINE CLINIC | Age: 82
End: 2022-10-25

## 2022-10-25 NOTE — TELEPHONE ENCOUNTER
Dr Sabrnia Tompkins,        Patient fell to floor and when aid came in he refused to let her call anyone,  called 911 and patient refused to allow them to pick him up off the floor and stayed there and states he will get up when he feels stronger.

## 2022-11-11 ENCOUNTER — TELEPHONE (OUTPATIENT)
Dept: FAMILY MEDICINE CLINIC | Age: 82
End: 2022-11-11

## 2022-11-11 NOTE — TELEPHONE ENCOUNTER
His nurse called to inform us that he wants to go home, and the nurse feels like he should go to long term care. Sharmila from [facility].

## 2022-11-14 ENCOUNTER — TELEPHONE (OUTPATIENT)
Dept: FAMILY MEDICINE CLINIC | Age: 82
End: 2022-11-14

## 2022-12-06 DIAGNOSIS — I25.10 CORONARY ARTERY DISEASE DUE TO LIPID RICH PLAQUE: ICD-10-CM

## 2022-12-06 DIAGNOSIS — E11.69 TYPE 2 DIABETES MELLITUS WITH OTHER SPECIFIED COMPLICATION, WITHOUT LONG-TERM CURRENT USE OF INSULIN (HCC): ICD-10-CM

## 2022-12-06 DIAGNOSIS — I25.83 CORONARY ARTERY DISEASE DUE TO LIPID RICH PLAQUE: ICD-10-CM

## 2022-12-06 RX ORDER — GABAPENTIN 300 MG/1
CAPSULE ORAL
Qty: 180 CAPSULE | Refills: 1 | OUTPATIENT
Start: 2022-12-06

## 2022-12-06 RX ORDER — CARVEDILOL 6.25 MG/1
TABLET ORAL
Qty: 60 TABLET | Refills: 2 | OUTPATIENT
Start: 2022-12-06

## 2022-12-14 DIAGNOSIS — I25.10 CORONARY ARTERY DISEASE DUE TO LIPID RICH PLAQUE: ICD-10-CM

## 2022-12-14 DIAGNOSIS — I25.83 CORONARY ARTERY DISEASE DUE TO LIPID RICH PLAQUE: ICD-10-CM

## 2022-12-14 RX ORDER — CARVEDILOL 6.25 MG/1
TABLET ORAL
Qty: 60 TABLET | Refills: 2 | Status: SHIPPED | OUTPATIENT
Start: 2022-12-14

## 2022-12-14 NOTE — TELEPHONE ENCOUNTER
Coreg pending refill      Health Maintenance   Topic Date Due    Shingles vaccine (2 of 3) 07/24/2008    Diabetic retinal exam  07/01/2016    Lipids  12/08/2016    Diabetic foot exam  02/24/2017    Flu vaccine (1) 08/01/2022    Annual Wellness Visit (AWV)  Never done    A1C test (Diabetic or Prediabetic)  01/21/2023    Depression Screen  07/21/2023    DTaP/Tdap/Td vaccine (2 - Td or Tdap) 07/24/2030    Pneumococcal 65+ years Vaccine  Completed    COVID-19 Vaccine  Completed    Hepatitis A vaccine  Aged Out    Hib vaccine  Aged Out    Meningococcal (ACWY) vaccine  Aged Out             (applicable per patient's age: Cancer Screenings, Depression Screening, Fall Risk Screening, Immunizations)    Hemoglobin A1C (%)   Date Value   07/21/2022 10.1   12/08/2015 6.1     LDL Calculated (mg/dL)   Date Value   12/08/2015 30     AST (U/L)   Date Value   12/08/2015 24     ALT (U/L)   Date Value   12/08/2015 19     BUN (mg/dL)   Date Value   12/08/2015 9      (goal A1C is < 7)   (goal LDL is <100) need 30-50% reduction from baseline     BP Readings from Last 3 Encounters:   07/21/22 136/74   08/13/18 (!) 173/97   08/13/18 (!) 157/73    (goal /80)      All Future Testing planned in [facility]:  Lab Frequency Next Occurrence   Lipid Panel Once 07/21/2022   CBC with Auto Differential Once 07/21/2022   Comprehensive Metabolic Panel Once 43/66/4241       Next Visit Date:  Future Appointments   Date Time Provider Mckinley Hernandez   12/20/2022 10:30 AM Patricio Gomez MD [address] [facility] [place]            Patient Active Problem List:     Chronic gastritis     Hiatal hernia     Gastric ulcer     Heart attack [facility])     Urinary incontinence     Body aches

## 2023-01-04 DIAGNOSIS — Z00.00 HEALTHCARE MAINTENANCE: ICD-10-CM

## 2023-01-05 ENCOUNTER — TELEPHONE (OUTPATIENT)
Dept: FAMILY MEDICINE CLINIC | Age: 83
End: 2023-01-05

## 2023-01-05 NOTE — TELEPHONE ENCOUNTER
----- Message from Roxie Sutton sent at 1/5/2023 10:33 AM EST -----  Subject: Message to Provider    QUESTIONS  Information for Provider? César Caceres is calling- his car wasn't starting so he   missed his 01/05 appt- already rescheduled his appt for 01/10.  ---------------------------------------------------------------------------  --------------  [id number]  [phone]; OK to leave message on voicemail  ---------------------------------------------------------------------------  --------------  SCRIPT ANSWERS  Relationship to Patient?  Self

## 2023-01-05 NOTE — TELEPHONE ENCOUNTER
E-scribe request for med refill. Please review and e-scribe if applicable.      Last Visit Date:  07/21/2022  Next Visit Date:  1/5/2023    Hemoglobin A1C (%)   Date Value   07/21/2022 10.1   12/08/2015 6.1             ( goal A1C is < 7)   No results found for: LABMICR  LDL Calculated (mg/dL)   Date Value   12/08/2015 30       (goal LDL is <100)   AST (U/L)   Date Value   12/08/2015 24     ALT (U/L)   Date Value   12/08/2015 19     BUN (mg/dL)   Date Value   12/08/2015 9     BP Readings from Last 3 Encounters:   07/21/22 136/74   08/13/18 (!) 173/97   08/13/18 (!) 157/73          (goal 120/80)        Patient Active Problem List:     Chronic gastritis     Hiatal hernia     Gastric ulcer     Heart attack ([address]     Urinary incontinence     Body aches      ----Lorrie Riley

## 2023-01-09 DIAGNOSIS — I25.83 CORONARY ARTERY DISEASE DUE TO LIPID RICH PLAQUE: ICD-10-CM

## 2023-01-09 DIAGNOSIS — I25.10 CORONARY ARTERY DISEASE DUE TO LIPID RICH PLAQUE: ICD-10-CM

## 2023-01-09 DIAGNOSIS — Z00.00 HEALTHCARE MAINTENANCE: ICD-10-CM

## 2023-01-09 RX ORDER — ATORVASTATIN CALCIUM 40 MG/1
40 TABLET, FILM COATED ORAL DAILY
Qty: 90 TABLET | Refills: 1 | Status: SHIPPED | OUTPATIENT
Start: 2023-01-09 | End: 2023-01-13 | Stop reason: SDUPTHER

## 2023-01-09 NOTE — TELEPHONE ENCOUNTER
E-scribe request for med refill. Please review and e-scribe if applicable.      Last Visit Date:  07/21/2022  Next Visit Date:  1/10/2023    Hemoglobin A1C (%)   Date Value   07/21/2022 10.1   12/08/2015 6.1             ( goal A1C is < 7)   No results found for: LABMICR  LDL Calculated (mg/dL)   Date Value   12/08/2015 30       (goal LDL is <100)   AST (U/L)   Date Value   12/08/2015 24     ALT (U/L)   Date Value   12/08/2015 19     BUN (mg/dL)   Date Value   12/08/2015 9     BP Readings from Last 3 Encounters:   07/21/22 136/74   08/13/18 (!) 173/97   08/13/18 (!) 157/73          (goal 120/80)        Patient Active Problem List:     Chronic gastritis     Hiatal hernia     Gastric ulcer     Heart attack ([address]     Urinary incontinence     Body aches      ----Rylan Salinas

## 2023-01-11 DIAGNOSIS — I25.83 CORONARY ARTERY DISEASE DUE TO LIPID RICH PLAQUE: ICD-10-CM

## 2023-01-11 DIAGNOSIS — Z00.00 HEALTHCARE MAINTENANCE: ICD-10-CM

## 2023-01-11 DIAGNOSIS — I25.10 CORONARY ARTERY DISEASE DUE TO LIPID RICH PLAQUE: ICD-10-CM

## 2023-01-11 RX ORDER — ASPIRIN 81 MG/1
TABLET, DELAYED RELEASE ORAL
Qty: 30 TABLET | Refills: 1 | Status: SHIPPED | OUTPATIENT
Start: 2023-01-11

## 2023-01-11 RX ORDER — SITAGLIPTIN 100 MG/1
TABLET, FILM COATED ORAL
Qty: 30 TABLET | Refills: 2 | Status: SHIPPED | OUTPATIENT
Start: 2023-01-11 | End: 2023-01-13 | Stop reason: SDUPTHER

## 2023-01-11 NOTE — TELEPHONE ENCOUNTER
E-scribe request for med refill. Please review and e-scribe if applicable.      Last Visit Date:  07/21/2022  Next Visit Date:  1/13/2023    Hemoglobin A1C (%)   Date Value   07/21/2022 10.1   12/08/2015 6.1             ( goal A1C is < 7)   No results found for: LABMICR  LDL Calculated (mg/dL)   Date Value   12/08/2015 30       (goal LDL is <100)   AST (U/L)   Date Value   12/08/2015 24     ALT (U/L)   Date Value   12/08/2015 19     BUN (mg/dL)   Date Value   12/08/2015 9     BP Readings from Last 3 Encounters:   07/21/22 136/74   08/13/18 (!) 173/97   08/13/18 (!) 157/73          (goal 120/80)        Patient Active Problem List:     Chronic gastritis     Hiatal hernia     Gastric ulcer     Heart attack ([address]     Urinary incontinence     Body aches      ----Bernadette Cohen

## 2023-01-13 ENCOUNTER — OFFICE VISIT (OUTPATIENT)
Dept: FAMILY MEDICINE CLINIC | Age: 83
End: 2023-01-13
Payer: MEDICARE

## 2023-01-13 VITALS
BODY MASS INDEX: 22.24 KG/M2 | DIASTOLIC BLOOD PRESSURE: 90 MMHG | HEART RATE: 72 BPM | TEMPERATURE: 98.3 F | WEIGHT: 142 LBS | SYSTOLIC BLOOD PRESSURE: 170 MMHG

## 2023-01-13 DIAGNOSIS — M25.551 RIGHT HIP PAIN: ICD-10-CM

## 2023-01-13 DIAGNOSIS — I25.83 CORONARY ARTERY DISEASE DUE TO LIPID RICH PLAQUE: ICD-10-CM

## 2023-01-13 DIAGNOSIS — E11.69 TYPE 2 DIABETES MELLITUS WITH OTHER SPECIFIED COMPLICATION, WITHOUT LONG-TERM CURRENT USE OF INSULIN (HCC): Primary | ICD-10-CM

## 2023-01-13 DIAGNOSIS — I25.10 CORONARY ARTERY DISEASE DUE TO LIPID RICH PLAQUE: ICD-10-CM

## 2023-01-13 DIAGNOSIS — Z00.00 HEALTHCARE MAINTENANCE: ICD-10-CM

## 2023-01-13 DIAGNOSIS — R82.90 URINE ABNORMALITY: ICD-10-CM

## 2023-01-13 PROBLEM — E11.9 DIABETES MELLITUS (HCC): Status: ACTIVE | Noted: 2023-01-13

## 2023-01-13 LAB — HBA1C MFR BLD: 7.2 %

## 2023-01-13 PROCEDURE — 99211 OFF/OP EST MAY X REQ PHY/QHP: CPT | Performed by: STUDENT IN AN ORGANIZED HEALTH CARE EDUCATION/TRAINING PROGRAM

## 2023-01-13 PROCEDURE — 83036 HEMOGLOBIN GLYCOSYLATED A1C: CPT | Performed by: STUDENT IN AN ORGANIZED HEALTH CARE EDUCATION/TRAINING PROGRAM

## 2023-01-13 RX ORDER — LISINOPRIL 10 MG/1
TABLET ORAL
Qty: 90 TABLET | Refills: 2 | Status: SHIPPED | OUTPATIENT
Start: 2023-01-13

## 2023-01-13 RX ORDER — TAMSULOSIN HYDROCHLORIDE 0.4 MG/1
0.4 CAPSULE ORAL DAILY
Qty: 30 CAPSULE | Refills: 0 | Status: SHIPPED | OUTPATIENT
Start: 2023-01-13

## 2023-01-13 RX ORDER — ATORVASTATIN CALCIUM 40 MG/1
40 TABLET, FILM COATED ORAL DAILY
Qty: 90 TABLET | Refills: 1 | Status: SHIPPED | OUTPATIENT
Start: 2023-01-13

## 2023-01-13 RX ORDER — CARVEDILOL 6.25 MG/1
TABLET ORAL
Qty: 60 TABLET | Refills: 2 | Status: SHIPPED | OUTPATIENT
Start: 2023-01-13

## 2023-01-13 NOTE — PATIENT INSTRUCTIONS
Thank you for letting us take care of you today. We hope all your questions were addressed. If a question was overlooked or something else comes to mind after you return home, please contact a member of your Care Team listed below. Your Care Team at [place] [address] is Team #5  Kamila Rahman MD (Faculty)  Sachin Winter MD (Resident)  Garrett Miranda MD (Resident)  Hugo Newman MD (Resident)  Viola Kuo, EDUARDO Harris. ,[facility] [place], [facility] [place] ([facility] [facility] office)  [facility] office)  Jason Perdom[place]and, [address] (Clinical Practice Manager)  Katerine Stallworth [facility] (Clinical Pharmacist)       Office phone number: [phone]    If you need to get in right away due to illness, please be advised we have \"Same Day\" appointments available Monday-Friday. Please call us at [phone] option #[phone] to schedule your \"Same Day\" appointment.

## 2023-01-13 NOTE — PROGRESS NOTES
Diabetic visit information    BP Readings from Last 3 Encounters:   07/21/22 136/74   08/13/18 (!) 173/97   08/13/18 (!) 157/73       Hemoglobin A1C (%)   Date Value   07/21/2022 10.1   12/08/2015 6.1     LDL Calculated (mg/dL)   Date Value   12/08/2015 30               Have you changed or started any medications since your last visit including any over-the-counter medicines, vitamins, or herbal medicines? no   Have you stopped taking any of your medications? Is so, why? -  no  Are you having any side effects from any of your medications? - no    Have you seen any other physician or provider since your last visit?  no   Have you had any other diagnostic tests since your last visit?  no   Have you been seen in the emergency room and/or had an admission in a hospital since we last saw you?  no     Have you had your annual diabetic retinal (eye) exam?   (ensure copy of exam is in the chart)    Have you had your routine dental cleaning in the past 6 months? no    Do you have an active MyChart account? If not, what are your barriers? No:     Patient Care Team:  Corazon Shepard MD as PCP - General (Emergency Medicine)    Medical history Review  Past Medical, Family, and Social History reviewed and does not contribute to the patient presenting condition.     Health Maintenance   Topic Date Due    Shingles vaccine (2 of 3) 07/24/2008    Diabetic retinal exam  07/01/2016    Lipids  12/08/2016    Flu vaccine (1) 08/01/2022    Annual Wellness Visit (AWV)  Never done    A1C test (Diabetic or Prediabetic)  01/21/2023    Depression Screen  07/21/2023    DTaP/Tdap/Td vaccine (2 - Td or Tdap) 07/24/2030    Pneumococcal 65+ years Vaccine  Completed    COVID-19 Vaccine  Completed    Hepatitis A vaccine  Aged Out    Hib vaccine  Aged Out    Meningococcal (ACWY) vaccine  Aged Out

## 2023-01-13 NOTE — PROGRESS NOTES
Attending Physician Statement  I have discussed the care of Denton Humphreys, 82 y.o. male,including pertinent history and exam findings,  with the resident Angelica Olsen MD.  History:  Chief Complaint   Patient presents with    Follow-up     Patient is following up on his dm    Diabetes     I have reviewed the key elements of the encounter with the resident. Examination was done by resident as documented in residents note.    BP Readings from Last 3 Encounters:   01/13/23 (!) 170/90   07/21/22 136/74   08/13/18 (!) 173/97     BP (!) 170/90   Pulse 72   Temp 98.3 °F (36.8 °C) (Oral)   Wt 142 lb (64.4 kg)   BMI 22.24 kg/m²   Lab Results   Component Value Date    WBC 7.8 09/14/2017    HGB 14.5 09/14/2017    HCT 43.8 09/14/2017    PLT Platelet clumps present, count appears adequate. 09/14/2017    CHOL 80 12/08/2015    TRIG 90 12/08/2015    HDL 32 (A) 12/08/2015    ALT 19 12/08/2015    AST 24 12/08/2015     12/08/2015    K 4.1 12/08/2015     12/08/2015    CREATININE 0.92 12/08/2015    BUN 9 12/08/2015    CO2 26 12/08/2015    PSA 2.41 03/20/2015    INR 1.0 09/05/2013    LABA1C 7.2 01/13/2023     Lab Results   Component Value Date    CALCIUM 9.7 12/08/2015    PHOS 3.7 08/02/2012     Lab Results   Component Value Date    LDLCALC 30 12/08/2015     I agree with the assessment, plan and diagnosis of    Diagnosis Orders   1. Type 2 diabetes mellitus with other specified complication, without long-term current use of insulin (HCC)  POCT glycosylated hemoglobin (Hb A1C)      2. Coronary artery disease due to lipid rich plaque  lisinopril (PRINIVIL;ZESTRIL) 10 MG tablet    atorvastatin (LIPITOR) 40 MG tablet    carvedilol (COREG) 6.25 MG tablet      3. Healthcare maintenance  SITagliptin (JANUVIA) 100 MG tablet    metFORMIN (GLUCOPHAGE) 1000 MG tablet      4. Urine abnormality  tamsulosin (FLOMAX) 0.4 MG capsule      5. Right hip pain  XR HIP RIGHT (2-3 VIEWS)        I agree with  orders as documented by the  resident. Recommendations: Agree with resident A&P. Return in about 6 months (around 7/13/2023).    (Dena Avery ) Dr. Charline Strong MD

## 2023-01-13 NOTE — PROGRESS NOTES
[address] Medicine Residency Program - Outpatient Note      Subjective: Milagros Terrell is a [address] y.o. male  presented to the office on 01/13/23 with complaints of: Follow-up on hospitalization    This is an 78-year-old gentleman with a history of type 2 diabetes mellitus not on insulin recently admitted to the hospital on 10/28/2022 due to concern of DKA and RACHEL. Patient left the facility AGAINST MEDICAL ADVICE after improved mentation. Patient came in today completely asymptomatic, A1c is 7.2 which is better as compared to 10.1 in July 2022. Patient is compliant with the medication per him. Patient is removing cheesecake from the diet and stays that he feels much better as of now. Patient is endorsing some right hip pain which is going on for couple of years, patient is experiencing pain on the right side especially when he lays down, no radiations, fever, chills. Patient does not take any medication and pain is intermittent in nature. Review of systems (Except what is mentioned in the HPI)  CONSTITUTIONAL: Negative  RESPIRATORY: Negative  CARDIOVASCULAR: Negative  GASTROINTESTINAL: Negative  GENITOURINARY: Negative   MUSCULOSKELETAL: Negative  NEUROLOGICAL: Negative  BEHAVIOR/PSYCH: Negative      Objective:      Vitals:    01/13/23 1120   BP: (!) 170/90   Pulse:    Temp:        General Appearance - Alert and oriented x 3  HEENT - No obvious deformity  Lungs - Bilateral good air entry , no wheezes or rales  Cardiovascular - Regular rate and rhythm. No murmur  Abdomen - Soft and nontender  Neurologic - No new focal motor or sensory deficits  Skin - No bruising or bleeding on exposed skin area  MSK - cannot assess range of motion as patient is on wheelchair and requires more assistance to get off chair. No tenderness appreciated  Psych - normal affect       Assessment:        ICD-10-CM    1.  Type 2 diabetes mellitus with other specified complication, without long-term current use of insulin (HCC)  E11.69 POCT glycosylated hemoglobin (Hb A1C)      2. Coronary artery disease due to lipid rich plaque  I25.10 lisinopril (PRINIVIL;ZESTRIL) 10 MG tablet    I25.83 atorvastatin (LIPITOR) 40 MG tablet     carvedilol (COREG) 6.25 MG tablet      3. Healthcare maintenance  Z00.00 SITagliptin (JANUVIA) 100 MG tablet     metFORMIN (GLUCOPHAGE) 1000 MG tablet      4. Urine abnormality  R82.90 tamsulosin (FLOMAX) 0.4 MG capsule      5. Right hip pain  M25.551 XR HIP RIGHT (2-3 VIEWS)          Plan:    Concern of patient recently being altered will rule out fracture with a right hip x-ray. Refill all the patient medication and sent to the patient preferred pharmacy. Spoke with the niece in details which later patient states that he does not want the physician to speak with anybody beside him. Niece is concerned that patient cannot take care of himself and refuses visiting nursing facility and home aide. Patient at this time is alert and oriented is not on any medication that can change his decision-making capacity said multiple times during the encounter that he is more than capable of taking care of himself and he does not need any extra help. Physician at the end of the encounter asked the patient that anytime he needs a physician help to let him know. Patient is agreeable with this plan. Return in about 6 months (around 7/13/2023).        Requested Prescriptions     Signed Prescriptions Disp Refills    lisinopril (PRINIVIL;ZESTRIL) 10 MG tablet 90 tablet 2     Sig: TAKE 1 TABLET DAILY    atorvastatin (LIPITOR) 40 MG tablet 90 tablet 1     Sig: Take 1 tablet by mouth daily    carvedilol (COREG) 6.25 MG tablet 60 tablet 2     Sig: TAKE 1 TABLET TWICE A DAY    SITagliptin (JANUVIA) 100 MG tablet 30 tablet 2     Sig: TAKE 1 TABLET BY MOUTH IN THE MORNING.    metFORMIN (GLUCOPHAGE) 1000 MG tablet 180 tablet 1     Sig: Take 1 tablet by mouth 2 times daily (with meals)    tamsulosin (FLOMAX) 0.4 MG capsule 30 capsule 0     Sig: Take 1 capsule by mouth daily       Medications Discontinued During This Encounter   Medication Reason    lisinopril (PRINIVIL;ZESTRIL) 2.5 MG tablet     tamsulosin (FLOMAX) 0.4 MG capsule REORDER    carvedilol (COREG) 6.25 MG tablet REORDER    metFORMIN (GLUCOPHAGE) 1000 MG tablet REORDER    atorvastatin (LIPITOR) 40 MG tablet REORDER    JANUVIA 100 MG tablet REORDER       Mahamed Keepers received counseling on the following healthy behaviors: nutrition, exercise and medication adherence    Discussed use, benefit, and side effects of prescribed medications. Barriers to medication compliance addressed. All patient questions answered. Pt voiced understanding. Disclaimer: Some oral of this note was transcribed using voice-recognition software. This may cause typographical errors occasionally. Although all effort is made to fix these errors, please do not hesitate to contact our office if there Denson Mantle concern with the understanding of this note.     Angelica Valente  PGY-3  Family Medicine     1/13/2023  12:33 PM

## 2023-01-17 NOTE — PROGRESS NOTES
[facility] PHYSICIANS  [facility] PH[facility]  [facility] [id number]     Date of Visit:  2023  Patient Name: Alex Mena   Patient :  1940       Alex Mena is a 78 y.o. male who presents today for an general visit to be evaluated for the following condition(s):  Chief Complaint   Patient presents with    Diabetes     Follow up on DM     Hip Pain    Shoulder Pain       Jennifer Lyle is a 77 yo male former smoker w/ hx of IDDM, CAD, Chronic gastritis and RACHEL in 2022 p/today for follow up on multiple issues including acute right hip pain, acute on chronic R. Shoulder pain, and diabetes. Regarding patient's diabetes, patient states that he is not insulin but which is also confirmed in looking through his external medications including Lantus. However patient does not know how much Lantus he is using and is currently checking his blood sugars at home. Patient is agreeable to getting a continuous glucose monitoring device and would like to look at this further. Patient states he was going to bring his medications with him to this visit so we can go over them however he forgot them at home. Patient is to call and provide the medications he is taking at home or at the very least the amount of the Lantus that he is using and when he is using it. Regarding patient's acute right hip pain, patient does state history of bilateral total knee replacements and that he was at home when the hip pain insidiously started. He states and denies no trauma or falls. He states the pain is almost entirely anterior hip with some radiation towards the knee. He does state intermittent inability to bear weight on his right leg. Patient states he is able to walk small distances though. Patient also states that he is not using anything for pain control at this time. He is a distant former smoker.   He also states a history of bilateral inguinal hernias with the left still present and bulging out at times, and distant repair of the right inguinal hernia with mesh. Regarding patient's acute on chronic right shoulder pain, patient states that he has received 3 different injections in his shoulder all at the [facility], operated by [facility] joint on reviewed and during physical exam.  He states some of the injections did show a lot of benefit to him. Patient also states that he did recently complete physical therapy but is not sure I would location and writer is unable to find records through the [employer] system. Patient states that he has mostly pain when lifting above 90 degrees and the pain is mostly anterior. Patient denies any weakness, numbness, change in color of his upper extremity. He also denies any neck pain as well or posterior shoulder pain. Regarding patient and using Megace, patient states that he got that from a previous primary care doctor who he states is Dr. Wendy Beasley. He states that he has been struggling with weight loss for years and low appetite. Patient does state a lot of concern regarding his medications and would like this to be further reviewed and possibly taking off medications. Patient attributes this fear largely to one of the person he knew who had issues with medications contributing to adverse effects with her health. Regarding patient blood pressure, patient states that his blood pressure is usually elevated in the office and that when he goes home that it is normative. Patient declines any further medication changes at this time for his blood pressure regiment and states understanding of the risks of elevated blood pressure including MI, from disability and worse. Patient denies any chest pain, shortness of breath, neurological symptoms, headache, etc. at this time    Regarding health maintenance, patient states that he would like to get the shingles shot and has already gotten the flu shot this year.   Patient made aware that we do not have the shingles shot at this time and to get this from his local pharmacy. He is also interested in healthy foods that may be more appetizing to him as he is does state a history of having poor control with sweets such as cheesecake in which he in October developed DKA and passed out due to this. Hip Pain   The incident occurred more than 1 week ago. The incident occurred at home. There was no injury mechanism. The pain is present in the right thigh and right hip. The quality of the pain is described as aching. The pain is severe (worse with walking and with sleep, leg will give out). The pain has been Constant since onset. Associated symptoms include an inability to bear weight (sometimes yes, sometimes not) and a loss of motion. Pertinent negatives include no loss of sensation, muscle weakness, numbness or tingling. The symptoms are aggravated by movement, palpation and weight bearing. He has tried nothing for the symptoms. Diabetes  He presents for his follow-up diabetic visit. He has type 2 diabetes mellitus. No MedicAlert identification noted. Onset time: Years. His disease course has been stable. There are no hypoglycemic associated symptoms. Pertinent negatives for hypoglycemia include no dizziness. Pertinent negatives for diabetes include no blurred vision, no chest pain, no fatigue and no visual change. Symptoms are stable. Diabetic complications include heart disease and peripheral neuropathy. Current diabetic treatment includes insulin injections and oral agent (dual therapy). Review of Systems:  Review of Systems   Constitutional:  Negative for fatigue. Eyes: Negative. Negative for blurred vision. Respiratory: Negative. Cardiovascular: Negative. Negative for chest pain. Musculoskeletal:  Positive for arthralgias, gait problem, joint swelling and myalgias. Negative for back pain, neck pain and neck stiffness. Neurological:  Negative for dizziness, tingling and numbness.      Prior to Admission medications    Medication Sig Start Date End Date Taking?  Authorizing Provider   lisinopril (PRINIVIL;ZESTRIL) 10 MG tablet TAKE 1 TABLET DAILY 1/13/23  Yes Angelica Morales MD   carvedilol (COREG) 6.25 MG tablet TAKE 1 TABLET TWICE A DAY 1/13/23  Yes Angelica Morales MD   SITagliptin (JANUVIA) 100 MG tablet TAKE 1 TABLET BY MOUTH IN THE MORNING. 1/13/23  Yes Angelica Morales MD   metFORMIN (GLUCOPHAGE) 1000 MG tablet Take 1 tablet by mouth 2 times daily (with meals) 1/13/23  Yes Angelica Morales MD   tamsulosin (FLOMAX) 0.4 MG capsule Take 1 capsule by mouth daily 1/13/23  Yes Angelica Morales MD   HM ASPIRIN EC LOW DOSE 81 MG EC tablet TAKE 1 TABLET BY MOUTH IN THE MORNING. 1/11/23  Yes Sobia Fuentes MD   traZODone (DESYREL) 100 MG tablet TAKE 2 TABLETS DAILY 7/16/15  Yes Kandy Lennon PA-C   Insulin Pen Needle (PEN NEEDLES) 31G X 8 MM MISC Inject 1 Device into the skin daily 12/14/22   Historical Provider, MD   atorvastatin (LIPITOR) 80 MG tablet Take 80 mg by mouth daily 10/15/22   Historical Provider, MD   LANTUS 100 UNIT/ML injection vial Inject 5 Units into the skin daily 12/6/22   Historical Provider, MD   gabapentin (NEURONTIN) 300 MG capsule TAKE 2 CAPSULES THREE TIMES A DAY 7/21/22 9/21/22  Angelica Morales MD        No Known Allergies    Patient Active Problem List   Diagnosis    Chronic gastritis    Hiatal hernia    Gastric ulcer    Heart attack ([address]    Urinary incontinence    Body aches    Diabetes mellitus ([address]    Coronary artery disease due to lipid rich plaque       Past Medical History:   Diagnosis Date    Arthritis     Balance problem     Cancer ([address]     pancreatic    Coronary artery disease due to lipid rich plaque 1/13/2023    Diabetes mellitus ([address]     Glaucoma     Heart attack ([address]     Hyperlipidemia     Hypertension     Urinary incontinence 10/9/2015       Past Surgical History:   Procedure Laterality Date    CATARACT REMOVAL Bilateral     COLONOSCOPY  10/20/2014    biopsies taken    COLONOSCOPY  8/5/2015 Colonoscopic biopsy and polypectomies x 3 ; coagulation destruction of large polyp bases x 2    COLONOSCOPY  09/26/2016    COLONOSCOPY  10/17/2016    COLONOSCOPY  08/13/2018    polypectomy    COLONOSCOPY  8/13/2018    COLONOSCOPY POLYPECTOMY COLD BIOPSY performed by Tacho Lerma MD at [facility] Bilateral     UPPER GASTROINTESTINAL ENDOSCOPY  4/28/14    UPPER GASTROINTESTINAL ENDOSCOPY  4/8/2015    UPPER GASTROINTESTINAL ENDOSCOPY  08/13/2018    biopsy    UPPER GASTROINTESTINAL ENDOSCOPY  8/13/2018    EGD BIOPSY performed by Tacho Lerma MD at [address] [place] [address]. History     Socioeconomic History    Marital status:      Spouse name: None    Number of children: None    Years of education: None    Highest education level: None   Tobacco Use    Smoking status: Former    Smokeless tobacco: Never   Substance and Sexual Activity    Alcohol use: No    Drug use: No     Social Determinants of Health     Financial Resource Strain: Low Risk     Difficulty of Paying Living Expenses: Not hard at all   Food Insecurity: No Food Insecurity    Worried About Running Out of Food in the Last Year: Never true    Ran Out of Food in the Last Year: Never true        Physical Exam:    BP (!) 188/82 (Site: Right Upper Arm, Position: Sitting, Cuff Size: Medium Adult)   Pulse 97   Temp 97.2 °F (36.2 °C) (Oral)   Ht 5' 7.01\" (1.702 m)   Wt 136 lb 12.8 oz (62.1 kg)   BMI 21.42 kg/m²    Physical Exam  Vitals and nursing note reviewed. Constitutional:       General: He is not in acute distress. Appearance: Normal appearance. He is normal weight. He is not ill-appearing, toxic-appearing or diaphoretic. Comments: In powered scooter   HENT:      Head: Normocephalic and atraumatic. Nose: Nose normal.   Eyes:      Extraocular Movements: Extraocular movements intact.    Neck: Vascular: No carotid bruit. Cardiovascular:      Rate and Rhythm: Normal rate and regular rhythm. Pulses: Normal pulses. Heart sounds: Murmur (Systolic murmur radiating to the carotid, mild to moderate) heard. No friction rub. No gallop. Pulmonary:      Effort: Pulmonary effort is normal. No respiratory distress. Breath sounds: Normal breath sounds. No stridor. No wheezing, rhonchi or rales. Chest:      Chest wall: No tenderness. Abdominal:      Tenderness: There is no abdominal tenderness. There is no guarding. Musculoskeletal:         General: Tenderness (Along the anterior hip and hip inguinal junction) present. No swelling or deformity. Cervical back: Normal range of motion. No rigidity or tenderness. Right lower leg: No edema. Left lower leg: No edema. Comments: Pain elicited with leg abduction as well as DAYTON and FADIR test with reproduction of anterior hip pain with negative leg roll test and negative straight leg raise test    On patient right shoulder patient is able to abduct the arm to approximately 80 degrees which elicits pain and has pain from 80 degrees to 170 degrees, positive crossover test, no pain elicited along posterior shoulder/scapula or neck, positive empty cans test and lift off test on the right upper extremity   Skin:     General: Skin is warm. Capillary Refill: Capillary refill takes less than 2 seconds. Neurological:      Mental Status: He is alert and oriented to person, place, and time. Motor: No weakness. Gait: Gait abnormal (Severely antalgic gait with patient favoring left leg). Psychiatric:         Mood and Affect: Mood normal.         Behavior: Behavior normal.       Assessment/Plan:  1.  Type 2 diabetes mellitus with other specified complication, without long-term current use of insulin ([facility])  Comments:  Stable, A1C 7.2, repeat A1C due July 2023, DM foot exam at [place], c/w current medications, refer to Fresh Rx program and Pharm team, need to confirm Insulin use  Orders:  -     [facility] Starting Fresh Fruits and Vegetable Rx Program  -     [facility] Primary Care Pharmacist  2. Poor appetite  Comments:  Patient likely candidate for CGM, will consider cessation of insulin accordingly, will consider stopping Megace as well  Orders:  -     [facility] Starting Fresh Fruits and Vegetable Rx Program  3. Right hip pain  Comments:  Acute, LEW vs Osteoarthritis vs other, will obtain XR to r/o fracture or significant interval change, US ordered in setting of hx of severe b/l inguinal hernia   Orders:  -     XR HIP 2-3 VW W PELVIS RIGHT; Future  -     External Referral To Home Health  4. Left inguinal hernia  Comments:  Stable, reduceable, will continue to watch, ER precautions given including but not limited to non-reduceable, color changes, severe pains, etc  Orders:  -     US SCROTUM W LIMITED DUPLEX; Future  -     External Referral To Home Health  5. Right rotator cuff tendinitis  Comments:  Likely supraspinatus and infraspinatus complicated by AC joint involvement, refer to PT, concern with fall risk with R. Hip pain, home PT/OT eval  Orders:  -     [facility]  6. AC joint arthropathy  Comments:  Will refer to PT again for shoulder pain, pt OK to use APAP for pain control, will follow   Orders:  -     [facility]  7. Difficulty in walking  -     External Referral To Home Health  8. Heart murmur  Comments:  Poss AS, will order ECHO on fol/up, pt asymptomatic at this time  9. Primary hypertension  Comments:  Uncontrolled, poss complicated by white coat component, BP decreasing while in room, pt to record BP, Pt asymptomatic, close fol/up     Return in about 4 weeks (around 2/17/2023) for Follow up Shoulder and hip pain .    Electronically signed by ADRIAN MARKS MD on 1/20/2023 at 2:56 PM

## 2023-01-20 ENCOUNTER — TELEPHONE (OUTPATIENT)
Dept: FAMILY MEDICINE CLINIC | Age: 83
End: 2023-01-20

## 2023-01-20 ENCOUNTER — OFFICE VISIT (OUTPATIENT)
Dept: FAMILY MEDICINE CLINIC | Age: 83
End: 2023-01-20

## 2023-01-20 VITALS
SYSTOLIC BLOOD PRESSURE: 188 MMHG | TEMPERATURE: 97.2 F | WEIGHT: 136.8 LBS | HEIGHT: 67 IN | HEART RATE: 97 BPM | BODY MASS INDEX: 21.47 KG/M2 | DIASTOLIC BLOOD PRESSURE: 82 MMHG

## 2023-01-20 DIAGNOSIS — R01.1 HEART MURMUR: ICD-10-CM

## 2023-01-20 DIAGNOSIS — M25.551 RIGHT HIP PAIN: ICD-10-CM

## 2023-01-20 DIAGNOSIS — M75.81 RIGHT ROTATOR CUFF TENDINITIS: ICD-10-CM

## 2023-01-20 DIAGNOSIS — I10 PRIMARY HYPERTENSION: ICD-10-CM

## 2023-01-20 DIAGNOSIS — M19.019 AC JOINT ARTHROPATHY: ICD-10-CM

## 2023-01-20 DIAGNOSIS — E11.69 TYPE 2 DIABETES MELLITUS WITH OTHER SPECIFIED COMPLICATION, WITHOUT LONG-TERM CURRENT USE OF INSULIN (HCC): Primary | ICD-10-CM

## 2023-01-20 DIAGNOSIS — K40.90 LEFT INGUINAL HERNIA: ICD-10-CM

## 2023-01-20 DIAGNOSIS — R63.0 POOR APPETITE: ICD-10-CM

## 2023-01-20 DIAGNOSIS — R26.2 DIFFICULTY IN WALKING: ICD-10-CM

## 2023-01-20 RX ORDER — INSULIN GLARGINE 100 [IU]/ML
5 INJECTION, SOLUTION SUBCUTANEOUS DAILY
COMMUNITY
Start: 2022-12-06

## 2023-01-20 RX ORDER — PEN NEEDLE, DIABETIC 30 GX3/16"
1 NEEDLE, DISPOSABLE MISCELLANEOUS DAILY
COMMUNITY
Start: 2022-12-14

## 2023-01-20 RX ORDER — ATORVASTATIN CALCIUM 80 MG/1
80 TABLET, FILM COATED ORAL DAILY
COMMUNITY
Start: 2022-10-15

## 2023-01-20 ASSESSMENT — PATIENT HEALTH QUESTIONNAIRE - PHQ9
SUM OF ALL RESPONSES TO PHQ QUESTIONS 1-9: 0
DEPRESSION UNABLE TO ASSESS: PT REFUSES
1. LITTLE INTEREST OR PLEASURE IN DOING THINGS: 0
SUM OF ALL RESPONSES TO PHQ QUESTIONS 1-9: 0
SUM OF ALL RESPONSES TO PHQ QUESTIONS 1-9: 0
SUM OF ALL RESPONSES TO PHQ9 QUESTIONS 1 & 2: 0
2. FEELING DOWN, DEPRESSED OR HOPELESS: 0
SUM OF ALL RESPONSES TO PHQ QUESTIONS 1-9: 0

## 2023-01-20 ASSESSMENT — ENCOUNTER SYMPTOMS
BLURRED VISION: 0
BACK PAIN: 0
RESPIRATORY NEGATIVE: 1
EYES NEGATIVE: 1
VISUAL CHANGE: 0

## 2023-01-20 NOTE — TELEPHONE ENCOUNTER
Called patient re: elevated blood pressure and patient checking blood pressure at home. Patient answered the phone and confirmed identity with two separate identifiers. Patient states he has no symptoms at this time and is doing well. He states he will check his BP and requests that writer call back in 15 minutes. Writer acknowledged this and patient instructed to call into the clinic with the number if writer is not able to do so. Will call the patient back as well and request that patient keep a BP log. Will also verify patient pain regiment as well. Called patient back at 4:12 PM to get blood pressure and unable to leave voicemail.

## 2023-01-20 NOTE — PROGRESS NOTES
Diabetic visit information    BP Readings from Last 3 Encounters:   01/13/23 (!) 170/90   07/21/22 136/74   08/13/18 (!) 173/97       Hemoglobin A1C (%)   Date Value   01/13/2023 7.2   07/21/2022 10.1   12/08/2015 6.1     LDL Calculated (mg/dL)   Date Value   12/08/2015 30               Have you changed or started any medications since your last visit including any over-the-counter medicines, vitamins, or herbal medicines? no   Have you stopped taking any of your medications? Is so, why? -  no  Are you having any side effects from any of your medications? - no    Have you seen any other physician or provider since your last visit?  no   Have you had any other diagnostic tests since your last visit?  no   Have you been seen in the emergency room and/or had an admission in a hospital since we last saw you?  no     Have you had your annual diabetic retinal (eye) exam? No   (ensure copy of exam is in the chart)    Have you had your routine dental cleaning in the past 6 months? no    Do you have an active [facility] account? If not, what are your barriers? No: Pending    Patient Care Team:  Katie Marion MD as PCP - General (Emergency Medicine)    Medical history Review  Past Medical, Family, and Social History reviewed and does contribute to the patient presenting condition.     Health Maintenance   Topic Date Due    Shingles vaccine (2 of 3) 07/24/2008    Diabetic retinal exam  07/01/2016    Lipids  12/08/2016    Diabetic foot exam  02/24/2017    Flu vaccine (1) 08/01/2022    Annual Wellness Visit (AWV)  Never done    A1C test (Diabetic or Prediabetic)  07/13/2023    Depression Screen  07/21/2023    DTaP/Tdap/Td vaccine (2 - Td or Tdap) 07/24/2030    Pneumococcal 65+ years Vaccine  Completed    COVID-19 Vaccine  Completed    Hepatitis A vaccine  Aged Out    Hib vaccine  Aged Out    Meningococcal (ACWY) vaccine  Aged Out

## 2023-01-20 NOTE — PATIENT INSTRUCTIONS
Thank you for letting us take care of you today. We hope all your questions were addressed. If a question was overlooked or something else comes to mind after you return home, please contact a member of your Care Team listed below. Your Care Team at [place] [address] is Team #5  Moraima Reynolds MD (Faculty)  Katelyn Mcdonald MD (Resident)  Willis Giron MD (Resident)  Jonathan Bonds MD (Resident)  Timoteo Andrews MD, (Resident)  Satya Norton., CMA  Cammie Villaseñor., RMA  Fareed Douglas.,  LPN  [employer] Amber Huang., [facility] office)  Rickey Amaro, [address] (Clinical Practice Manager)  Gera Corrales, [facility] (Clinical Pharmacist)       Office phone number: [phone]    If you need to get in right away due to illness, please be advised we have \"Same Day\" appointments available Monday-Friday. Please call us at [phone] option #3 to schedule your \"Same Day\" appointment.

## 2023-01-25 DIAGNOSIS — I25.83 CORONARY ARTERY DISEASE DUE TO LIPID RICH PLAQUE: ICD-10-CM

## 2023-01-25 DIAGNOSIS — I25.10 CORONARY ARTERY DISEASE DUE TO LIPID RICH PLAQUE: ICD-10-CM

## 2023-01-25 RX ORDER — ASPIRIN 81 MG/1
TABLET, DELAYED RELEASE ORAL
Qty: 30 TABLET | Refills: 1 | OUTPATIENT
Start: 2023-01-25

## 2023-01-26 ENCOUNTER — HOSPITAL ENCOUNTER (OUTPATIENT)
Age: 83
Discharge: HOME | End: 2023-01-28
Payer: MEDICARE

## 2023-01-26 ENCOUNTER — HOSPITAL ENCOUNTER (OUTPATIENT)
Dept: GENERAL RADIOLOGY | Age: 83
Discharge: HOME | End: 2023-01-28
Payer: MEDICARE

## 2023-01-26 DIAGNOSIS — M25.551 RIGHT HIP PAIN: ICD-10-CM

## 2023-01-26 PROCEDURE — 73502 X-RAY EXAM HIP UNI 2-3 VIEWS: CPT

## 2023-01-27 ENCOUNTER — TELEPHONE (OUTPATIENT)
Dept: FAMILY MEDICINE CLINIC | Age: 83
End: 2023-01-27

## 2023-01-27 DIAGNOSIS — K40.90 INGUINAL HERNIA WITHOUT OBSTRUCTION OR GANGRENE, RECURRENCE NOT SPECIFIED, UNSPECIFIED LATERALITY: Primary | ICD-10-CM

## 2023-01-27 NOTE — TELEPHONE ENCOUNTER
Mile calling from scheduling stating that [address] that was ordered is the wrong test for the diagnosiss. She states it needs to be an US extremity left non vascular limited - test is pending please sign asap patients appointment for test is Monday.

## 2023-02-01 ENCOUNTER — TELEPHONE (OUTPATIENT)
Dept: FAMILY MEDICINE CLINIC | Age: 83
End: 2023-02-01

## 2023-02-01 DIAGNOSIS — I10 PRIMARY HYPERTENSION: ICD-10-CM

## 2023-02-01 DIAGNOSIS — E11.69 TYPE 2 DIABETES MELLITUS WITH OTHER SPECIFIED COMPLICATION, WITHOUT LONG-TERM CURRENT USE OF INSULIN (HCC): Primary | ICD-10-CM

## 2023-02-01 NOTE — TELEPHONE ENCOUNTER
Patient has yet to provide since last visit his insulin amount and confirm type of insulin, route, etc. Since last visit. He was also to provide the BP or BP for several days as well since his BP was elevated in office. MA team to assist writer after several tries to reach patient.

## 2023-02-02 PROBLEM — I10 PRIMARY HYPERTENSION: Status: ACTIVE | Noted: 2023-02-02

## 2023-02-02 NOTE — TELEPHONE ENCOUNTER
Spoke with patient, patient stated he called with the information gave somebody the numbers, writer explained to the patient we did not have any encounter with any information that he called and spoke with anybody. Patient then stated he was using the Lantus injection vial and the amount he is using is 142. Patient stated he was never told to keep a log of his BP readings and that his BP is always high when he comes in the office, he did not remember the last BP reading. Writer explained this information would be relayed to the physician.

## 2023-02-02 NOTE — TELEPHONE ENCOUNTER
Will consult community nursing as patient continues to report his BG and not his insulin amount or his blood pressure. Will also consider SW consult or home health care referral as well. Will have MA team schedule patient for sooner visit than the 20th and to bring all his medications including his insulin. Will also place pharmacy consult for further DM management, consideration for CGM with history of DKA and LOC, and polypharmacy review.

## 2023-02-06 ENCOUNTER — TELEPHONE (OUTPATIENT)
Dept: FAMILY MEDICINE CLINIC | Age: 83
End: 2023-02-06

## 2023-02-06 NOTE — TELEPHONE ENCOUNTER
----- Message from Coy Liz sent at 2/6/2023  8:41 AM EST -----  Subject: Message to Provider    QUESTIONS  Information for Provider? Pt states he had an appt at [facility] for 3:45   for his lab testing but they do not have him in the system for an appt   today, wondering if there is anything the office can do to help him with   this so he can get this testing done.   ---------------------------------------------------------------------------  --------------  [address]  [phone]; OK to leave message on voicemail  ---------------------------------------------------------------------------  --------------  SCRIPT ANSWERS  Relationship to Patient?  Self

## 2023-02-06 NOTE — TELEPHONE ENCOUNTER
Spoke with patient regarding [place] appointment scheduled, patient stated he called over to [facility] they told him he was not on the schedule, writer then tried to explain to the patient he was scheduled the appointment time is in the chart, he again stated \"I called over to [address] they told me they did not have me in the system, I am not going on a wild goose hunt to [address]. \"     Writer then called over to [facility]. Natalya's to confirm patient's appointment, called the patient back tried to explain to the patient he was [facility] that they did confirm the appointment. Patient continued to state he does not know why they told him one thing and me another. Patient was not trying to comprehend what he was being told, wanted to continue saying he was not in their system. Writer tried to explain if he did not talk to the right person they may not have had him schedule.

## 2023-02-14 ENCOUNTER — HOSPITAL ENCOUNTER (OUTPATIENT)
Dept: ULTRASOUND IMAGING | Age: 83
Discharge: HOME | End: 2023-02-16
Payer: MEDICARE

## 2023-02-14 DIAGNOSIS — K40.90 LEFT INGUINAL HERNIA: ICD-10-CM

## 2023-02-14 DIAGNOSIS — M25.551 RIGHT HIP PAIN: ICD-10-CM

## 2023-02-14 DIAGNOSIS — K40.90 INGUINAL HERNIA WITHOUT OBSTRUCTION OR GANGRENE, RECURRENCE NOT SPECIFIED, UNSPECIFIED LATERALITY: ICD-10-CM

## 2023-02-14 PROCEDURE — 76882 US LMTD JT/FCL EVL NVASC XTR: CPT

## 2023-02-21 ENCOUNTER — OFFICE VISIT (OUTPATIENT)
Dept: FAMILY MEDICINE CLINIC | Age: 83
End: 2023-02-21

## 2023-02-21 VITALS
DIASTOLIC BLOOD PRESSURE: 80 MMHG | HEIGHT: 67 IN | HEART RATE: 85 BPM | TEMPERATURE: 98 F | SYSTOLIC BLOOD PRESSURE: 148 MMHG | BODY MASS INDEX: 21.6 KG/M2 | WEIGHT: 137.6 LBS

## 2023-02-21 DIAGNOSIS — G89.29 CHRONIC RIGHT SHOULDER PAIN: Primary | ICD-10-CM

## 2023-02-21 DIAGNOSIS — I50.30 HEART FAILURE WITH PRESERVED EJECTION FRACTION, UNSPECIFIED HF CHRONICITY (HCC): ICD-10-CM

## 2023-02-21 DIAGNOSIS — M25.511 CHRONIC RIGHT SHOULDER PAIN: Primary | ICD-10-CM

## 2023-02-21 DIAGNOSIS — K40.90 LEFT INGUINAL HERNIA: ICD-10-CM

## 2023-02-21 RX ORDER — ACETAMINOPHEN 500 MG
500 TABLET ORAL 4 TIMES DAILY PRN
Qty: 120 TABLET | Refills: 0 | Status: SHIPPED | OUTPATIENT
Start: 2023-02-21

## 2023-02-21 NOTE — PROGRESS NOTES
6 [facility] Medicine Residency Program - Outpatient Note      Subjective: Marty Meneses is a 78 y.o. male  presented to the office on 02/21/23 with complaints of: Follow-up on the previous lab    This is an 75-year-old gentleman with multiple issues, history of CAD s/p stent, insulin-dependent diabetes mellitus, history of essential hypertension, history of right inguinal repair with \complains of right shoulder pain. Cc: Right shoulder pain  Dominant hand (If applicable): Right  Onset: years ago  Mechanism: unknown  Characteristic: dull and aching  Duration: constant  Worsening/Improving: worsening  Aggravating factors: movement  Relieving factors: none  Associated symptoms: Nonradiating, no numbness and tingling sensation, fever, chills, direct trauma  Radiation: none  Intervention tried: injection in the past   Direct trauma/fall/injury: none  Weakness/numbess/tingling: none  Diabetes/Thyroid disease: DM      Review of systems (Except what is mentioned in the HPI)  CONSTITUTIONAL: Negative  RESPIRATORY: Negative  CARDIOVASCULAR: Negative for orthopnea, shortness of breath, chest pressure and PND  GASTROINTESTINAL: Negative  GENITOURINARY: Negative   MUSCULOSKELETAL: Negative  NEUROLOGICAL: Negative  BEHAVIOR/PSYCH: Negative      Objective:      Vitals:    02/21/23 1516   BP: (!) 148/80   Pulse: 85   Temp:        General Appearance - Alert and oriented x 3  HEENT - No obvious deformity  Lungs - Bilateral good air entry , no wheezes or rales  Cardiovascular - Regular rate and rhythm.   Systolic murmur on aortic region r  Abdomen - Soft and nontender  Neurologic - No new focal motor or sensory deficits  Skin - No bruising or bleeding on exposed skin area  MSK -restricted range of motion, muscle strength intact, empty can test is somewhat positive  Psych - normal affect   No chronic stasis dermatitis changes, dry skin, no visible swelling appreciated      Assessment:        ICD-10-CM 1. Chronic right shoulder pain  M25.511 XR SHOULDER RIGHT (MIN 2 VIEWS)    G89.29 Raquel Ryan DO, Orthopedic Surgery, [facility]     acetaminophen (TYLENOL) 500 MG tablet      2. Left inguinal hernia  K40.90 CT PELVIS W CONTRAST      3. Heart failure with preserved ejection fraction, unspecified HF chronicity ([address]  I50.30 ECHO 2D WO Color Doppler Complete          Plan:    Chronic right pain, will do an x-ray and refer patient to orthopedic for further assessment. Patient might have osteoarthritis or evidence of capsulitis which is causing his issues. Patient said that since he is in agonizing pain, patient will be benefiting from MRI to rule out any rotator cuff injury. Recently did an ultrasound which shows a left hernia is unprepared, patient is completely asymptomatic, will do a CT pelvis with contrast and based on the finding will probably refer patient to general surgery for further evaluation. Intermittent complaints of peripheral edema, with a history of CAD s/p stent we will do an echo to further assess systolic function. Since patient is insulin-dependent, advised the patient to bring all the medication in the next encounter for further assessment. Return in about 2 months (around 4/21/2023). Requested Prescriptions     Signed Prescriptions Disp Refills    acetaminophen (TYLENOL) 500 MG tablet 120 tablet 0     Sig: Take 1 tablet by mouth 4 times daily as needed for Pain       There are no discontinued medications. Lorin Lopez received counseling on the following healthy behaviors: nutrition, exercise and medication adherence    Discussed use, benefit, and side effects of prescribed medications. Barriers to medication compliance addressed. All patient questions answered. Pt voiced understanding. Disclaimer: Some oral of this note was transcribed using voice-recognition software. This may cause typographical errors occasionally.  Although all effort is made to fix these errors, please do not hesitate to contact our office if there Kasi Solitario concern with the understanding of this note.     Angelica Duran  PGY-3  Family Medicine     2/21/2023  4:21 PM

## 2023-02-21 NOTE — PROGRESS NOTES
Attending Physician Statement  I have discussed the care of Violetta Clark, 78 y.o. male,including pertinent history and exam findings,  with the resident Dr. Juan Zelaya MD.  History:  Chief Complaint   Patient presents with    Shoulder Pain     Right shoulder gets worst by the day    Hip Pain     Hip pain on th right side gets worst by the day     I have reviewed the key elements of the encounter with the resident. Examination was done by resident as documented in residents note. BP Readings from Last 3 Encounters:   02/21/23 (!) 148/80   01/20/23 (!) 188/82   01/13/23 (!) 170/90     BP (!) 148/80 (Site: Left Upper Arm, Position: Sitting, Cuff Size: Medium Adult)   Pulse 85   Temp 98 °F (36.7 °C) (Oral)   Ht 5' 7.01\" (1.702 m)   Wt 137 lb 9.6 oz (62.4 kg)   BMI 21.55 kg/m²   Lab Results   Component Value Date    WBC 7.8 09/14/2017    HGB 14.5 09/14/2017    HCT 43.8 09/14/2017    PLT Platelet clumps present, count appears adequate. 09/14/2017    CHOL 80 12/08/2015    TRIG 90 12/08/2015    HDL 32 (A) 12/08/2015    ALT 19 12/08/2015    AST 24 12/08/2015     12/08/2015    K 4.1 12/08/2015     12/08/2015    CREATININE 0.92 12/08/2015    BUN 9 12/08/2015    CO2 26 12/08/2015    PSA 2.41 03/20/2015    INR 1.0 09/05/2013    LABA1C 7.2 01/13/2023     Lab Results   Component Value Date    CALCIUM 9.7 12/08/2015    PHOS 3.7 08/02/2012     Lab Results   Component Value Date    LDLCALC 30 12/08/2015     I agree with the assessment, plan and diagnosis of    Diagnosis Orders   1. Left inguinal hernia        2. Heart failure with preserved ejection fraction, unspecified HF chronicity ([facility] [address]          I agree with  orders as documented by the resident. Recommendations: Agree with resident A&P. Patient to bring all medications with him to next visit within 2-4 weeks. No follow-ups on file.    (Leann Litten ) Dr. Glenis Courtney MD

## 2023-02-21 NOTE — PROGRESS NOTES
Visit Information    Have you changed or started any medications since your last visit including any over-the-counter medicines, vitamins, or herbal medicines? no   Have you stopped taking any of your medications? Is so, why? -  no  Are you having any side effects from any of your medications? - no    Have you seen any other physician or provider since your last visit?  no   Have you had any other diagnostic tests since your last visit? yes - XR, US   Have you been seen in the emergency room and/or had an admission in a hospital since we last saw you?  no   Have you had your routine dental cleaning in the past 6 months?  no     Do you have an active MyChart account? If no, what is the barrier?   No: Pending    Patient Care Team:  Kayla Swartz MD as PCP - General (Emergency Medicine)    Medical History Review  Past Medical, Family, and Social History reviewed and does contribute to the patient presenting condition    Health Maintenance   Topic Date Due    Shingles vaccine (2 of 3) 07/24/2008    Diabetic retinal exam  07/01/2016    Lipids  12/08/2016    Diabetic foot exam  02/24/2017    Flu vaccine (1) 08/01/2022    Annual Wellness Visit (AWV)  Never done    A1C test (Diabetic or Prediabetic)  07/13/2023    Depression Screen  01/20/2024    DTaP/Tdap/Td vaccine (2 - Td or Tdap) 07/24/2030    Pneumococcal 65+ years Vaccine  Completed    COVID-19 Vaccine  Completed    Hepatitis A vaccine  Aged Out    Hib vaccine  Aged Out    Meningococcal (ACWY) vaccine  Aged Out

## 2023-02-21 NOTE — PATIENT INSTRUCTIONS
Thank you for letting us take care of you today. We hope all your questions were addressed. If a question was overlooked or something else comes to mind after you return home, please contact a member of your Care Team listed below. Your Care Team at [place] [address] is Team #5  Tiny Osei MD (Faculty)  Teresita Casper MD (Resident)  Wanda Ng MD (Resident)  Philippe Pickett MD (Resident)  Marek Zepeda MD, (Resident)  Elena Silver., ALLY Waldron., RMA  Esther Mark.,  LPN  Otilia Guerin., Ranjan Chawla., [facility] office)  Betsy Christian, [address] [facility] [address] [facility] (Clinical Practice Manager)  Ramo Shaw [facility] (Clinical Pharmacist)       Office phone number: [phone]    If you need to get in right away due to illness, please be advised we have \"Same Day\" appointments available Monday-Friday. Please call us at [phone] option #3 to schedule your \"Same Day\" appointment.

## 2023-04-04 DIAGNOSIS — M25.511 CHRONIC RIGHT SHOULDER PAIN: ICD-10-CM

## 2023-04-04 DIAGNOSIS — G89.29 CHRONIC RIGHT SHOULDER PAIN: ICD-10-CM

## 2023-04-04 RX ORDER — PSEUDOEPHED/ACETAMINOPH/DIPHEN 30MG-500MG
TABLET ORAL
Qty: 120 TABLET | Refills: 0 | Status: SHIPPED | OUTPATIENT
Start: 2023-04-04

## 2023-04-04 NOTE — TELEPHONE ENCOUNTER
Body aches     Diabetes mellitus ([facility] [address]     Coronary artery disease due to lipid rich plaque     Primary hypertension     Chronic right shoulder pain     Left inguinal hernia     Heart failure with preserved ejection fraction ([facility] [address]

## 2023-04-05 ENCOUNTER — OFFICE VISIT (OUTPATIENT)
Dept: FAMILY MEDICINE CLINIC | Age: 83
End: 2023-04-05
Payer: MEDICARE

## 2023-04-05 VITALS
HEART RATE: 76 BPM | WEIGHT: 135.4 LBS | BODY MASS INDEX: 21.25 KG/M2 | DIASTOLIC BLOOD PRESSURE: 58 MMHG | HEIGHT: 67 IN | SYSTOLIC BLOOD PRESSURE: 101 MMHG

## 2023-04-05 DIAGNOSIS — K40.90 LEFT INGUINAL HERNIA: ICD-10-CM

## 2023-04-05 DIAGNOSIS — I25.83 CORONARY ARTERY DISEASE DUE TO LIPID RICH PLAQUE: ICD-10-CM

## 2023-04-05 DIAGNOSIS — I25.10 CORONARY ARTERY DISEASE DUE TO LIPID RICH PLAQUE: ICD-10-CM

## 2023-04-05 DIAGNOSIS — Z00.00 MEDICARE ANNUAL WELLNESS VISIT, SUBSEQUENT: Primary | ICD-10-CM

## 2023-04-05 DIAGNOSIS — Z91.81 AT HIGH RISK FOR FALLS: ICD-10-CM

## 2023-04-05 DIAGNOSIS — Z71.89 ACP (ADVANCE CARE PLANNING): ICD-10-CM

## 2023-04-05 PROCEDURE — 3074F SYST BP LT 130 MM HG: CPT | Performed by: STUDENT IN AN ORGANIZED HEALTH CARE EDUCATION/TRAINING PROGRAM

## 2023-04-05 PROCEDURE — 3078F DIAST BP <80 MM HG: CPT | Performed by: STUDENT IN AN ORGANIZED HEALTH CARE EDUCATION/TRAINING PROGRAM

## 2023-04-05 PROCEDURE — 1123F ACP DISCUSS/DSCN MKR DOCD: CPT | Performed by: STUDENT IN AN ORGANIZED HEALTH CARE EDUCATION/TRAINING PROGRAM

## 2023-04-05 PROCEDURE — G0439 PPPS, SUBSEQ VISIT: HCPCS | Performed by: STUDENT IN AN ORGANIZED HEALTH CARE EDUCATION/TRAINING PROGRAM

## 2023-04-05 RX ORDER — ATORVASTATIN CALCIUM 40 MG/1
40 TABLET, FILM COATED ORAL DAILY
Qty: 30 TABLET | Refills: 3 | Status: SHIPPED | OUTPATIENT
Start: 2023-04-05 | End: 2023-04-18

## 2023-04-05 SDOH — ECONOMIC STABILITY: FOOD INSECURITY: WITHIN THE PAST 12 MONTHS, YOU WORRIED THAT YOUR FOOD WOULD RUN OUT BEFORE YOU GOT MONEY TO BUY MORE.: NEVER TRUE

## 2023-04-05 SDOH — ECONOMIC STABILITY: HOUSING INSECURITY
IN THE LAST 12 MONTHS, WAS THERE A TIME WHEN YOU DID NOT HAVE A STEADY PLACE TO SLEEP OR SLEPT IN A SHELTER (INCLUDING NOW)?: NO

## 2023-04-05 SDOH — ECONOMIC STABILITY: INCOME INSECURITY: HOW HARD IS IT FOR YOU TO PAY FOR THE VERY BASICS LIKE FOOD, HOUSING, MEDICAL CARE, AND HEATING?: NOT HARD AT ALL

## 2023-04-05 SDOH — ECONOMIC STABILITY: FOOD INSECURITY: WITHIN THE PAST 12 MONTHS, THE FOOD YOU BOUGHT JUST DIDN'T LAST AND YOU DIDN'T HAVE MONEY TO GET MORE.: NEVER TRUE

## 2023-04-05 ASSESSMENT — PATIENT HEALTH QUESTIONNAIRE - PHQ9
2. FEELING DOWN, DEPRESSED OR HOPELESS: 0
SUM OF ALL RESPONSES TO PHQ QUESTIONS 1-9: 0
SUM OF ALL RESPONSES TO PHQ9 QUESTIONS 1 & 2: 0
SUM OF ALL RESPONSES TO PHQ QUESTIONS 1-9: 0
1. LITTLE INTEREST OR PLEASURE IN DOING THINGS: 0
SUM OF ALL RESPONSES TO PHQ QUESTIONS 1-9: 0
SUM OF ALL RESPONSES TO PHQ QUESTIONS 1-9: 0

## 2023-04-05 ASSESSMENT — LIFESTYLE VARIABLES
HOW MANY STANDARD DRINKS CONTAINING ALCOHOL DO YOU HAVE ON A TYPICAL DAY: PATIENT DOES NOT DRINK
HOW OFTEN DO YOU HAVE A DRINK CONTAINING ALCOHOL: NEVER

## 2023-04-06 ENCOUNTER — CLINICAL DOCUMENTATION (OUTPATIENT)
Dept: SPIRITUAL SERVICES | Age: 83
End: 2023-04-06

## 2023-04-06 NOTE — ACP (ADVANCE CARE PLANNING)
Advance Care Planning   Ambulatory ACP Specialist Patient Outreach    Date:  4/6/2023    ACP Specialist:  Betsy Gannon    Outreach call to patient in follow-up to ACP Specialist referral from:    [x] PCP  [] Provider   [] Ambulatory Care Management [] Other     For:                  [x] Advance Directive Assistance              [] Complete Portable DNR order              [] Complete POST/POLST/MOST              [] Code Status Discussion             [] Discuss Goals of Care             [] Early ACP Decision-Making              [] Other (Specify)    Date Referral Received:4/5/23    Outreaches:       [x] 1st -  Date:  4/6/23                              Intervention:  [] Spoke with Patient  By: [] Phone [x] Left Voice mail [] Email / Mail    [] MarkaVIPhart  [] Other (Specify) : Outcomes: Left detailed VM for Patient to return call.            [] 2nd -  Date:                                Intervention:  [] Spoke with Patient  By: [] Phone [] Left Voice mail [] Email / Mail    [] MarkaVIPhart  [] Other [facility]) : Outcomes:                [] 3rd -  Date:                               Intervention:  [] Spoke with Patient  By: [] Phone [] Left Voice mail [] Email / Mail    [] MarkaVIPhart  [] Other [facility]) : Outcomes:           []  Additional Outreach -  Date:     (Specify Dates & special circumstances): Outcomes:       Next Step:   [] ACP scheduled conversation  [x] Outreach again in one week               [] Email / Mail ACP Info Sheets  [] Email / Mail Advance Directive   [] Closing referral.  Routing closure to referring provider/staff and to ACP Specialist . [] Closure letter mailed to patient with invitation to contact ACP Specialist if / when ready. [] Other (Specify here):       Thank you for this referral.

## 2023-04-18 ENCOUNTER — OFFICE VISIT (OUTPATIENT)
Dept: FAMILY MEDICINE CLINIC | Age: 83
End: 2023-04-18
Payer: MEDICARE

## 2023-04-18 VITALS
SYSTOLIC BLOOD PRESSURE: 101 MMHG | HEIGHT: 67 IN | BODY MASS INDEX: 22.13 KG/M2 | DIASTOLIC BLOOD PRESSURE: 62 MMHG | HEART RATE: 64 BPM | WEIGHT: 141 LBS

## 2023-04-18 DIAGNOSIS — I25.10 CORONARY ARTERY DISEASE DUE TO LIPID RICH PLAQUE: ICD-10-CM

## 2023-04-18 DIAGNOSIS — E11.69 TYPE 2 DIABETES MELLITUS WITH OTHER SPECIFIED COMPLICATION, WITHOUT LONG-TERM CURRENT USE OF INSULIN (HCC): ICD-10-CM

## 2023-04-18 DIAGNOSIS — I25.83 CORONARY ARTERY DISEASE DUE TO LIPID RICH PLAQUE: ICD-10-CM

## 2023-04-18 PROCEDURE — 99213 OFFICE O/P EST LOW 20 MIN: CPT | Performed by: STUDENT IN AN ORGANIZED HEALTH CARE EDUCATION/TRAINING PROGRAM

## 2023-04-18 PROCEDURE — 1123F ACP DISCUSS/DSCN MKR DOCD: CPT | Performed by: STUDENT IN AN ORGANIZED HEALTH CARE EDUCATION/TRAINING PROGRAM

## 2023-04-18 PROCEDURE — 3074F SYST BP LT 130 MM HG: CPT | Performed by: STUDENT IN AN ORGANIZED HEALTH CARE EDUCATION/TRAINING PROGRAM

## 2023-04-18 PROCEDURE — 3051F HG A1C>EQUAL 7.0%<8.0%: CPT | Performed by: STUDENT IN AN ORGANIZED HEALTH CARE EDUCATION/TRAINING PROGRAM

## 2023-04-18 PROCEDURE — G8427 DOCREV CUR MEDS BY ELIG CLIN: HCPCS | Performed by: STUDENT IN AN ORGANIZED HEALTH CARE EDUCATION/TRAINING PROGRAM

## 2023-04-18 PROCEDURE — 1036F TOBACCO NON-USER: CPT | Performed by: STUDENT IN AN ORGANIZED HEALTH CARE EDUCATION/TRAINING PROGRAM

## 2023-04-18 PROCEDURE — G8420 CALC BMI NORM PARAMETERS: HCPCS | Performed by: STUDENT IN AN ORGANIZED HEALTH CARE EDUCATION/TRAINING PROGRAM

## 2023-04-18 PROCEDURE — 3078F DIAST BP <80 MM HG: CPT | Performed by: STUDENT IN AN ORGANIZED HEALTH CARE EDUCATION/TRAINING PROGRAM

## 2023-04-18 RX ORDER — ATORVASTATIN CALCIUM 80 MG/1
80 TABLET, FILM COATED ORAL DAILY
Qty: 90 TABLET | Refills: 2 | Status: SHIPPED | OUTPATIENT
Start: 2023-04-18 | End: 2023-07-17

## 2023-04-18 RX ORDER — GABAPENTIN 600 MG/1
600 TABLET ORAL 2 TIMES DAILY
Qty: 90 TABLET | Refills: 0 | Status: SHIPPED | OUTPATIENT
Start: 2023-04-18 | End: 2023-06-02

## 2023-04-18 RX ORDER — LISINOPRIL 2.5 MG/1
TABLET ORAL
Qty: 90 TABLET | Refills: 5 | Status: SHIPPED | OUTPATIENT
Start: 2023-04-18

## 2023-04-18 RX ORDER — GABAPENTIN 300 MG/1
CAPSULE ORAL
Qty: 180 CAPSULE | Refills: 1 | Status: CANCELLED | OUTPATIENT
Start: 2023-04-18 | End: 2023-06-19

## 2023-04-18 ASSESSMENT — PATIENT HEALTH QUESTIONNAIRE - PHQ9
1. LITTLE INTEREST OR PLEASURE IN DOING THINGS: 0
2. FEELING DOWN, DEPRESSED OR HOPELESS: 0
SUM OF ALL RESPONSES TO PHQ QUESTIONS 1-9: 0
SUM OF ALL RESPONSES TO PHQ QUESTIONS 1-9: 0
SUM OF ALL RESPONSES TO PHQ9 QUESTIONS 1 & 2: 0
SUM OF ALL RESPONSES TO PHQ QUESTIONS 1-9: 0
SUM OF ALL RESPONSES TO PHQ QUESTIONS 1-9: 0

## 2023-04-18 NOTE — PATIENT INSTRUCTIONS
Thank you for letting us take care of you today. We hope all your questions were addressed. If a question was overlooked or something else comes to mind after you return home, please contact a member of your Care Team listed below. Your Care Team at [place] [address] is Team #5  Joesph Boss MD (Faculty)  Gordy Franco MD (Faculty)  Marco Morrell MD (Resident)  Kirby Monroe MD (Resident)  Kate Whitaker MD (Resident)  Gigi Marina MD, (Resident)  Vinita Blank., WILLOW Stack.,  EDUARDO Valdovinos., Lisseth Mohamud., [facility] office)  Leonor Phillips, [address] (Clinical Practice Manager)  Emma Dickens [facility] (Clinical Pharmacist)       Office phone number: [phone]    If you need to get in right away due to illness, please be advised we have \"Same Day\" appointments available Monday-Friday. Please call us at [phone] option #3 to schedule your \"Same Day\" appointment.

## 2023-04-18 NOTE — PROGRESS NOTES
Visit Information    Have you changed or started any medications since your last visit including any over-the-counter medicines, vitamins, or herbal medicines? no   Are you having any side effects from any of your medications? -  no  Have you stopped taking any of your medications? Is so, why? -  no    Have you seen any other physician or provider since your last visit? No  Have you had any other diagnostic tests since your last visit? No  Have you been seen in the emergency room and/or had an admission to a hospital since we last saw you? No  Have you had your routine dental cleaning in the past 6 months? no    Have you activated your [employer] account? If not, what are your barriers?  No:      Patient Care Team:  Margaret Paredes MD as PCP - General (Emergency Medicine)    Medical History Review  Past Medical, Family, and Social History reviewed and does not contribute to the patient presenting condition    Health Maintenance   Topic Date Due    Shingles vaccine (2 of 3) 07/24/2008    Diabetic retinal exam  07/01/2016    Lipids  12/08/2016    Diabetic foot exam  02/24/2017    A1C test (Diabetic or Prediabetic)  07/13/2023    Flu vaccine (Season Ended) 08/01/2023    Depression Screen  04/05/2024    Annual Wellness Visit (AWV)  04/05/2024    DTaP/Tdap/Td vaccine (2 - Td or Tdap) 07/24/2030    Pneumococcal 65+ years Vaccine  Completed    COVID-19 Vaccine  Completed    Hepatitis A vaccine  Aged Out    Hib vaccine  Aged Out    Meningococcal (ACWY) vaccine  Aged Out

## 2023-04-18 NOTE — PROGRESS NOTES
Attending Physician Statement  I  have discussed the care of Gal Oneil including pertinent history and exam findings with the resident. I agree with the assessment, plan and orders as documented by the resident. CAD/DM  Meds reconciled  Gabapentin dose decreased      /62 (Site: Left Upper Arm, Position: Sitting, Cuff Size: Medium Adult)   Pulse 64   Ht 5' 7\" (1.702 m)   Wt 141 lb (64 kg)   BMI 22.08 kg/m²    BP Readings from Last 3 Encounters:   04/18/23 101/62   04/05/23 (!) 101/58   02/21/23 (!) 148/80     Wt Readings from Last 3 Encounters:   04/18/23 141 lb (64 kg)   04/05/23 135 lb 6.4 oz (61.4 kg)   02/21/23 137 lb 9.6 oz (62.4 kg)          Diagnosis Orders   1. Coronary artery disease due to lipid rich plaque  lisinopril (PRINIVIL;ZESTRIL) 2.5 MG tablet    atorvastatin (LIPITOR) 80 MG tablet      2.  Type 2 diabetes mellitus with other specified complication, without long-term current use of insulin (HCC)  gabapentin (NEURONTIN) 600 MG tablet    Hemoglobin A1C    Lipid Panel          Otto Collins MD 4/18/2023 4:30 PM

## 2023-04-19 ENCOUNTER — OFFICE VISIT (OUTPATIENT)
Dept: SURGERY | Age: 83
End: 2023-04-19
Payer: MEDICARE

## 2023-04-19 ENCOUNTER — TELEPHONE (OUTPATIENT)
Dept: FAMILY MEDICINE CLINIC | Age: 83
End: 2023-04-19

## 2023-04-19 VITALS
WEIGHT: 141 LBS | SYSTOLIC BLOOD PRESSURE: 182 MMHG | DIASTOLIC BLOOD PRESSURE: 81 MMHG | HEIGHT: 67 IN | BODY MASS INDEX: 22.13 KG/M2 | HEART RATE: 60 BPM

## 2023-04-19 DIAGNOSIS — K40.90 LEFT INGUINAL HERNIA: Primary | ICD-10-CM

## 2023-04-19 PROCEDURE — G8420 CALC BMI NORM PARAMETERS: HCPCS | Performed by: STUDENT IN AN ORGANIZED HEALTH CARE EDUCATION/TRAINING PROGRAM

## 2023-04-19 PROCEDURE — 99203 OFFICE O/P NEW LOW 30 MIN: CPT | Performed by: STUDENT IN AN ORGANIZED HEALTH CARE EDUCATION/TRAINING PROGRAM

## 2023-04-19 PROCEDURE — G8427 DOCREV CUR MEDS BY ELIG CLIN: HCPCS | Performed by: STUDENT IN AN ORGANIZED HEALTH CARE EDUCATION/TRAINING PROGRAM

## 2023-04-19 PROCEDURE — 3078F DIAST BP <80 MM HG: CPT | Performed by: STUDENT IN AN ORGANIZED HEALTH CARE EDUCATION/TRAINING PROGRAM

## 2023-04-19 PROCEDURE — 3074F SYST BP LT 130 MM HG: CPT | Performed by: STUDENT IN AN ORGANIZED HEALTH CARE EDUCATION/TRAINING PROGRAM

## 2023-04-19 PROCEDURE — 1036F TOBACCO NON-USER: CPT | Performed by: STUDENT IN AN ORGANIZED HEALTH CARE EDUCATION/TRAINING PROGRAM

## 2023-04-19 PROCEDURE — 1123F ACP DISCUSS/DSCN MKR DOCD: CPT | Performed by: STUDENT IN AN ORGANIZED HEALTH CARE EDUCATION/TRAINING PROGRAM

## 2023-04-19 NOTE — TELEPHONE ENCOUNTER
Scheduling contacted office and wanted to check status if any orders was placed for patient yesterday at visit 4/18/23. Writer reviewed pt chart, CT was placed prior and pt would need to get this done. Scheduling will caroline CT. Also, advise xray in system and labs for pt to get done.

## 2023-04-26 ENCOUNTER — HOSPITAL ENCOUNTER (OUTPATIENT)
Dept: CT IMAGING | Age: 83
Discharge: HOME | End: 2023-04-28
Payer: MEDICARE

## 2023-04-26 ENCOUNTER — HOSPITAL ENCOUNTER (OUTPATIENT)
Age: 83
Discharge: HOME | End: 2023-04-28
Payer: MEDICARE

## 2023-04-26 ENCOUNTER — HOSPITAL ENCOUNTER (OUTPATIENT)
Dept: GENERAL RADIOLOGY | Age: 83
Discharge: HOME | End: 2023-04-28
Payer: MEDICARE

## 2023-04-26 DIAGNOSIS — K40.90 LEFT INGUINAL HERNIA: ICD-10-CM

## 2023-04-26 DIAGNOSIS — M25.511 CHRONIC RIGHT SHOULDER PAIN: ICD-10-CM

## 2023-04-26 DIAGNOSIS — G89.29 CHRONIC RIGHT SHOULDER PAIN: ICD-10-CM

## 2023-04-26 LAB
CREAT SERPL-MCNC: 0.89 MG/DL (ref 0.7–1.2)
GFR SERPL CREATININE-BSD FRML MDRD: >60 ML/MIN/1.73M2

## 2023-04-26 PROCEDURE — 72193 CT PELVIS W/DYE: CPT

## 2023-04-26 PROCEDURE — 36415 COLL VENOUS BLD VENIPUNCTURE: CPT

## 2023-04-26 PROCEDURE — 82565 ASSAY OF CREATININE: CPT

## 2023-04-26 PROCEDURE — 2580000003 HC RX 258: Performed by: STUDENT IN AN ORGANIZED HEALTH CARE EDUCATION/TRAINING PROGRAM

## 2023-04-26 PROCEDURE — 6360000004 HC RX CONTRAST MEDICATION: Performed by: STUDENT IN AN ORGANIZED HEALTH CARE EDUCATION/TRAINING PROGRAM

## 2023-04-26 PROCEDURE — 73030 X-RAY EXAM OF SHOULDER: CPT

## 2023-04-26 RX ORDER — 0.9 % SODIUM CHLORIDE 0.9 %
80 INTRAVENOUS SOLUTION INTRAVENOUS ONCE
Status: COMPLETED | OUTPATIENT
Start: 2023-04-26 | End: 2023-04-26

## 2023-04-26 RX ORDER — SODIUM CHLORIDE 0.9 % (FLUSH) 0.9 %
10 SYRINGE (ML) INJECTION PRN
Status: DISCONTINUED | OUTPATIENT
Start: 2023-04-26 | End: 2023-04-29 | Stop reason: HOSPADM

## 2023-04-26 RX ADMIN — IOPAMIDOL 75 ML: 755 INJECTION, SOLUTION INTRAVENOUS at 09:18

## 2023-04-26 RX ADMIN — SODIUM CHLORIDE 80 ML: 0.9 INJECTION, SOLUTION INTRAVENOUS at 09:19

## 2023-04-26 RX ADMIN — SODIUM CHLORIDE, PRESERVATIVE FREE 10 ML: 5 INJECTION INTRAVENOUS at 09:18

## 2023-04-28 RX ORDER — SODIUM CHLORIDE, SODIUM LACTATE, POTASSIUM CHLORIDE, CALCIUM CHLORIDE 600; 310; 30; 20 MG/100ML; MG/100ML; MG/100ML; MG/100ML
1000 INJECTION, SOLUTION INTRAVENOUS CONTINUOUS
Status: CANCELLED | OUTPATIENT
Start: 2023-04-28

## 2023-05-03 ENCOUNTER — HOSPITAL ENCOUNTER (OUTPATIENT)
Dept: GENERAL RADIOLOGY | Age: 83
Discharge: HOME | End: 2023-05-05
Payer: MEDICARE

## 2023-05-03 ENCOUNTER — HOSPITAL ENCOUNTER (OUTPATIENT)
Dept: PREADMISSION TESTING | Age: 83
Discharge: HOME | End: 2023-05-03
Payer: MEDICARE

## 2023-05-03 VITALS
RESPIRATION RATE: 18 BRPM | DIASTOLIC BLOOD PRESSURE: 101 MMHG | SYSTOLIC BLOOD PRESSURE: 179 MMHG | WEIGHT: 141 LBS | TEMPERATURE: 99 F | BODY MASS INDEX: 22.13 KG/M2 | HEART RATE: 74 BPM | OXYGEN SATURATION: 100 % | HEIGHT: 67 IN

## 2023-05-03 LAB
ANION GAP SERPL CALCULATED.3IONS-SCNC: 20 MMOL/L (ref 9–17)
BUN SERPL-MCNC: 16 MG/DL (ref 8–23)
CHLORIDE SERPL-SCNC: 106 MMOL/L (ref 98–107)
CO2 SERPL-SCNC: 19 MMOL/L (ref 20–31)
CREAT SERPL-MCNC: 0.96 MG/DL (ref 0.7–1.2)
GFR SERPL CREATININE-BSD FRML MDRD: >60 ML/MIN/1.73M2
GLUCOSE SERPL-MCNC: 134 MG/DL (ref 70–99)
HCT VFR BLD AUTO: 43 % (ref 40.7–50.3)
HGB BLD-MCNC: 14.6 G/DL (ref 13–17)
MCH RBC QN AUTO: 31.3 PG (ref 25.2–33.5)
MCHC RBC AUTO-ENTMCNC: 34 G/DL (ref 28.4–34.8)
MCV RBC AUTO: 92.3 FL (ref 82.6–102.9)
NRBC AUTOMATED: 0 PER 100 WBC
PDW BLD-RTO: 12.9 % (ref 11.8–14.4)
PLATELET # BLD AUTO: NORMAL K/UL (ref 138–453)
PLATELET, FLUORESCENCE: 122 K/UL (ref 138–453)
PLATELET, IMMATURE FRACTION: 10.1 % (ref 1.1–10.3)
POTASSIUM SERPL-SCNC: 3.6 MMOL/L (ref 3.7–5.3)
RBC # BLD: 4.66 M/UL (ref 4.21–5.77)
SODIUM SERPL-SCNC: 145 MMOL/L (ref 135–144)
WBC # BLD AUTO: 7.2 K/UL (ref 3.5–11.3)

## 2023-05-03 PROCEDURE — 93005 ELECTROCARDIOGRAM TRACING: CPT | Performed by: ANESTHESIOLOGY

## 2023-05-03 PROCEDURE — 36415 COLL VENOUS BLD VENIPUNCTURE: CPT

## 2023-05-03 PROCEDURE — 82565 ASSAY OF CREATININE: CPT

## 2023-05-03 PROCEDURE — 85027 COMPLETE CBC AUTOMATED: CPT

## 2023-05-03 PROCEDURE — 71046 X-RAY EXAM CHEST 2 VIEWS: CPT

## 2023-05-03 PROCEDURE — 80051 ELECTROLYTE PANEL: CPT

## 2023-05-03 PROCEDURE — 82947 ASSAY GLUCOSE BLOOD QUANT: CPT

## 2023-05-03 PROCEDURE — 85055 RETICULATED PLATELET ASSAY: CPT

## 2023-05-03 PROCEDURE — 84520 ASSAY OF UREA NITROGEN: CPT

## 2023-05-03 NOTE — PROGRESS NOTES
Anesthesia Focused Assessment    Hx of anesthesia complications:  no  Family hx of anesthesia complications:  no      Tested positive for Covid-19 in last 8 weeks: no      STOP-BANG Sleep Apnea Questionnaire    SNORE loudly (heard through closed doors)? No  TIRED, fatigued, sleepy during daytime? No  OBSERVED stopping breathing during sleep? No  High blood PRESSURE or being treated? Yes    BMI over 35? No  AGE over 48? Yes  NECK circumference over 16\"? No  GENDER (male)? Yes             Total 3  High risk 5-8  Intermediate risk 3-4  Low risk 0-2    ----------------------------------------------------------------------------------------------------------------------  OLIVER                              No  If yes, machine? DM1                                            No  DM2                   Yes    Coronary Artery Disease      Yes, stents \"a few years ago\" per patient  HTN         Yes, on meds  Defib/AICD/Pacemaker               No             Renal Failure                   No  If yes, on dialysis           Active smoker? No  Drinks alcohol? No  Illicit drugs? No  Dentition? Wears full upper and lower dentures      Past Medical History:   Diagnosis Date    Arthritis     Balance problem     Cancer ([facility] [address] [facility]     pancreatic. pt denies    Coronary artery disease due to lipid rich plaque 01/13/2023    Diabetes mellitus (HCC)     Glaucoma     Heart attack ([facility] [address]     Hiatal hernia     Hyperlipidemia     Hypertension     Inguinal hernia     Urinary incontinence 10/09/2015    Wellness examination     Dr. Crissy Alexander         Patient was evaluated in PAT & anesthesia guidelines were applied. NPO guidelines, medication instructions and scheduled arrival time were reviewed with patient. Advised patient or guardian to please notify surgeon's office if patient or child becomes ill prior to surgery.

## 2023-05-04 LAB
EKG ATRIAL RATE: 61 BPM
EKG P AXIS: 21 DEGREES
EKG P-R INTERVAL: 176 MS
EKG Q-T INTERVAL: 436 MS
EKG QRS DURATION: 78 MS
EKG QTC CALCULATION (BAZETT): 438 MS
EKG R AXIS: -28 DEGREES
EKG T AXIS: 68 DEGREES
EKG VENTRICULAR RATE: 61 BPM

## 2023-05-04 PROCEDURE — 93010 ELECTROCARDIOGRAM REPORT: CPT | Performed by: INTERNAL MEDICINE

## 2023-05-05 ENCOUNTER — TELEPHONE (OUTPATIENT)
Dept: FAMILY MEDICINE CLINIC | Age: 83
End: 2023-05-05

## 2023-05-05 DIAGNOSIS — Z01.810 ENCOUNTER FOR PREOPERATIVE ASSESSMENT FOR NONCORONARY CARDIAC SURGERY: ICD-10-CM

## 2023-05-05 DIAGNOSIS — Z95.5 H/O HEART ARTERY STENT: Primary | ICD-10-CM

## 2023-05-05 NOTE — TELEPHONE ENCOUNTER
Patient called in confused about why his echo procedure was cancelled, Jodie Chapa stated to the patient that his insurance will not cover that procedure and he would have to pay out of pocket for this procedure to be done and the patient could not understand why he have to pay out of pocket. Writer suggest he call his insurance and patient stated why. Writer asked the patient did he have anybody in the home with him so the writer can talk with someone, patient states no he live by himself and hung up, patient called back again asking the same question why was his echo cancelled this the patient 4th time calling in asking the same question. The office did make appointment for clearance for his 5-16-23 surgery.

## 2023-05-05 NOTE — TELEPHONE ENCOUNTER
He does have hx of CAD and he needs an echo before he can have inguinal hernia repair, I entered different code which might help but let him come and we can discuss again, we can also refer him to cardiologist which might help to get this echo done before procedure.        Thank You

## 2024-04-20 NOTE — PROGRESS NOTES
6 [facility] Medicine Residency Program - Outpatient Note      Subjective: Jacqueline Feliz is a 78 y.o. male  presented to the office on 04/18/23 with complaints of:   Medication reconciliation    This is a 68-year-old gentleman with a history of CAD s/p stent per him, type 2 diabetes not on insulin, BPH, left inguinal hernia is coming in today with concern of his hernia and medication reconciliation. CC: Essential hypertension in setting of CAD s/p stent  Blood pressure is at goal, patient is asymptomatic, denied any chest pain, shortness of breath, racing of heart or abdominal pain. Will give aspirin 81 and Lipitor 80 Mg, continue Coreg 6.25 lisinopril 2.5 Mg daily. Try to call the pharmacy to update the medication, unfortunately they did not . Cc: Type 2 diabetes  Denied any episodes of polyuria, polyphagia or polydipsia. Reorder hemoglobin A1c and lipid panel, continue metformin 1000 mg twice daily, Januvia 100 Mg daily, gabapentin 600 mg 3 times daily, denied any peripheral neuropathy symptoms during this encounter. Review of systems (Except what is mentioned in the HPI)  CONSTITUTIONAL: Negative  RESPIRATORY: Negative  CARDIOVASCULAR: Negative  GASTROINTESTINAL: Negative  GENITOURINARY: Negative   MUSCULOSKELETAL: Negative  NEUROLOGICAL: Negative  BEHAVIOR/PSYCH: Negative      Objective:      Vitals:    04/18/23 1323   BP: 101/62   Pulse: 64       General Appearance - Alert and oriented x 3  HEENT - No obvious deformity  Lungs - Bilateral good air entry , no wheezes or rales  Cardiovascular - Regular rate and rhythm. No murmur  Abdomen - Soft and nontender  Neurologic - No new focal motor or sensory deficits  Skin - No bruising or bleeding on exposed skin area  MSK - No new joint/bone pains   Psych - normal affect       Assessment:        ICD-10-CM    1.  Coronary artery disease due to lipid rich plaque  I25.10     I25.83           Plan:    Essential A handoff report was given to EP Team.

## (undated) DEVICE — YANKAUER,FLEXIBLE HANDLE,REGLR CAPACITY: Brand: MEDLINE INDUSTRIES, INC.

## (undated) DEVICE — PLATE ES AD W 9FT CRD 2

## (undated) DEVICE — BLOCK BITE 60FR RUBBER ADLT DENTAL

## (undated) DEVICE — FORCEPS BX L240CM JAW DIA2.2MM RAD JAW 4 HOT DISP

## (undated) DEVICE — Device: Brand: DEFENDO VALVE AND CONNECTOR KIT

## (undated) DEVICE — CUP MED 1OZ CLR POLYPR FEED GRAD W/O LID

## (undated) DEVICE — TUBING, SUCTION, 1/4" X 12', STRAIGHT: Brand: MEDLINE